# Patient Record
Sex: FEMALE | Race: WHITE | NOT HISPANIC OR LATINO | Employment: OTHER | URBAN - METROPOLITAN AREA
[De-identification: names, ages, dates, MRNs, and addresses within clinical notes are randomized per-mention and may not be internally consistent; named-entity substitution may affect disease eponyms.]

---

## 2018-08-16 PROBLEM — I65.29 CAROTID ARTERY STENOSIS: Status: ACTIVE | Noted: 2018-08-16

## 2018-08-16 PROBLEM — I65.21 STENOSIS OF RIGHT CAROTID ARTERY: Status: ACTIVE | Noted: 2018-08-16

## 2018-10-11 PROBLEM — I48.0 PAF (PAROXYSMAL ATRIAL FIBRILLATION): Status: ACTIVE | Noted: 2018-10-11

## 2018-10-11 PROBLEM — D72.829 LEUKOCYTOSIS: Status: ACTIVE | Noted: 2018-10-11

## 2018-10-11 PROBLEM — J18.9 PNEUMONIA: Status: ACTIVE | Noted: 2018-10-11

## 2021-03-06 PROBLEM — E87.5 HYPERKALEMIA: Status: ACTIVE | Noted: 2021-03-06

## 2021-03-06 PROBLEM — D64.9 ANEMIA: Status: ACTIVE | Noted: 2021-03-06

## 2021-03-06 PROBLEM — R06.02 SOB (SHORTNESS OF BREATH): Status: ACTIVE | Noted: 2021-03-06

## 2021-03-06 PROBLEM — E87.20 LACTIC ACIDOSIS: Status: ACTIVE | Noted: 2021-03-06

## 2021-03-08 PROBLEM — D50.0 IRON DEFICIENCY ANEMIA DUE TO CHRONIC BLOOD LOSS: Status: ACTIVE | Noted: 2021-03-08

## 2021-03-09 PROBLEM — I48.91 ATRIAL FIBRILLATION: Status: ACTIVE | Noted: 2021-03-09

## 2021-10-08 ENCOUNTER — HOSPITAL ENCOUNTER (OUTPATIENT)
Facility: HOSPITAL | Age: 71
LOS: 1 days | Discharge: HOME | End: 2021-10-09
Attending: FAMILY MEDICINE | Admitting: STUDENT IN AN ORGANIZED HEALTH CARE EDUCATION/TRAINING PROGRAM
Payer: MEDICARE

## 2021-10-08 DIAGNOSIS — D50.0 IRON DEFICIENCY ANEMIA DUE TO CHRONIC BLOOD LOSS: ICD-10-CM

## 2021-10-08 DIAGNOSIS — N17.9 AKI (ACUTE KIDNEY INJURY): ICD-10-CM

## 2021-10-08 DIAGNOSIS — K57.92 DIVERTICULITIS: ICD-10-CM

## 2021-10-08 DIAGNOSIS — K92.2 GASTROINTESTINAL HEMORRHAGE, UNSPECIFIED GASTROINTESTINAL HEMORRHAGE TYPE: ICD-10-CM

## 2021-10-08 DIAGNOSIS — R07.9 CHEST PAIN: ICD-10-CM

## 2021-10-08 DIAGNOSIS — R06.02 SOB (SHORTNESS OF BREATH): ICD-10-CM

## 2021-10-08 DIAGNOSIS — D64.9 ANEMIA, UNSPECIFIED TYPE: Primary | ICD-10-CM

## 2021-10-08 PROBLEM — D72.829 LEUKOCYTOSIS: Status: RESOLVED | Noted: 2018-10-11 | Resolved: 2021-10-08

## 2021-10-08 LAB
ABO GROUP BLD: NORMAL
ALBUMIN SERPL BCP-MCNC: 3.1 G/DL (ref 3.5–5.2)
ALP SERPL-CCNC: 89 U/L (ref 55–135)
ALT SERPL W/O P-5'-P-CCNC: 7 U/L (ref 10–44)
ANION GAP SERPL CALC-SCNC: 15 MMOL/L (ref 8–16)
ANISOCYTOSIS BLD QL SMEAR: ABNORMAL
AST SERPL-CCNC: 9 U/L (ref 10–40)
BASOPHILS # BLD AUTO: 0.08 K/UL (ref 0–0.2)
BASOPHILS NFR BLD: 0.4 % (ref 0–1.9)
BILIRUB SERPL-MCNC: 0.4 MG/DL (ref 0.1–1)
BLD GP AB SCN CELLS X3 SERPL QL: NORMAL
BNP SERPL-MCNC: 261 PG/ML (ref 0–99)
BUN SERPL-MCNC: 21 MG/DL (ref 8–23)
CALCIUM SERPL-MCNC: 9.2 MG/DL (ref 8.7–10.5)
CHLORIDE SERPL-SCNC: 105 MMOL/L (ref 95–110)
CO2 SERPL-SCNC: 19 MMOL/L (ref 23–29)
CREAT SERPL-MCNC: 1.9 MG/DL (ref 0.5–1.4)
D DIMER PPP IA.FEU-MCNC: 3.28 MG/L FEU
DIFFERENTIAL METHOD: ABNORMAL
EOSINOPHIL # BLD AUTO: 0.2 K/UL (ref 0–0.5)
EOSINOPHIL NFR BLD: 0.9 % (ref 0–8)
ERYTHROCYTE [DISTWIDTH] IN BLOOD BY AUTOMATED COUNT: 20 % (ref 11.5–14.5)
EST. GFR  (AFRICAN AMERICAN): 29.9 ML/MIN/1.73 M^2
EST. GFR  (NON AFRICAN AMERICAN): 26 ML/MIN/1.73 M^2
GLUCOSE SERPL-MCNC: 154 MG/DL (ref 70–110)
HCT VFR BLD AUTO: 22.1 % (ref 37–48.5)
HGB BLD-MCNC: 5.6 G/DL (ref 12–16)
IMM GRANULOCYTES # BLD AUTO: 0.3 K/UL (ref 0–0.04)
IMM GRANULOCYTES NFR BLD AUTO: 1.6 % (ref 0–0.5)
LYMPHOCYTES # BLD AUTO: 1.3 K/UL (ref 1–4.8)
LYMPHOCYTES NFR BLD: 7 % (ref 18–48)
MCH RBC QN AUTO: 17.4 PG (ref 27–31)
MCHC RBC AUTO-ENTMCNC: 25.3 G/DL (ref 32–36)
MCV RBC AUTO: 69 FL (ref 82–98)
MONOCYTES # BLD AUTO: 1.2 K/UL (ref 0.3–1)
MONOCYTES NFR BLD: 6.8 % (ref 4–15)
NEUTROPHILS # BLD AUTO: 15.3 K/UL (ref 1.8–7.7)
NEUTROPHILS NFR BLD: 83.3 % (ref 38–73)
NRBC BLD-RTO: 1 /100 WBC
OB PNL STL: POSITIVE
PLATELET # BLD AUTO: 632 K/UL (ref 150–450)
PLATELET BLD QL SMEAR: ABNORMAL
PMV BLD AUTO: 9.8 FL (ref 9.2–12.9)
POLYCHROMASIA BLD QL SMEAR: ABNORMAL
POTASSIUM SERPL-SCNC: 4.3 MMOL/L (ref 3.5–5.1)
PROT SERPL-MCNC: 7.2 G/DL (ref 6–8.4)
RBC # BLD AUTO: 3.22 M/UL (ref 4–5.4)
RH BLD: NORMAL
SARS-COV-2 RDRP RESP QL NAA+PROBE: NEGATIVE
SODIUM SERPL-SCNC: 139 MMOL/L (ref 136–145)
TARGETS BLD QL SMEAR: ABNORMAL
WBC # BLD AUTO: 18.35 K/UL (ref 3.9–12.7)

## 2021-10-08 PROCEDURE — 36415 COLL VENOUS BLD VENIPUNCTURE: CPT | Performed by: FAMILY MEDICINE

## 2021-10-08 PROCEDURE — S0030 INJECTION, METRONIDAZOLE: HCPCS | Performed by: STUDENT IN AN ORGANIZED HEALTH CARE EDUCATION/TRAINING PROGRAM

## 2021-10-08 PROCEDURE — 74176 CT ABDOMEN PELVIS WITHOUT CONTRAST: ICD-10-PCS | Mod: 26,,, | Performed by: RADIOLOGY

## 2021-10-08 PROCEDURE — 71250 CT THORAX DX C-: CPT | Mod: TC

## 2021-10-08 PROCEDURE — 80053 COMPREHEN METABOLIC PANEL: CPT | Performed by: FAMILY MEDICINE

## 2021-10-08 PROCEDURE — 93005 ELECTROCARDIOGRAM TRACING: CPT

## 2021-10-08 PROCEDURE — 27000221 HC OXYGEN, UP TO 24 HOURS

## 2021-10-08 PROCEDURE — 25000003 PHARM REV CODE 250: Performed by: FAMILY MEDICINE

## 2021-10-08 PROCEDURE — 85379 FIBRIN DEGRADATION QUANT: CPT | Performed by: FAMILY MEDICINE

## 2021-10-08 PROCEDURE — 86900 BLOOD TYPING SEROLOGIC ABO: CPT | Performed by: FAMILY MEDICINE

## 2021-10-08 PROCEDURE — 82272 OCCULT BLD FECES 1-3 TESTS: CPT | Performed by: FAMILY MEDICINE

## 2021-10-08 PROCEDURE — 83880 ASSAY OF NATRIURETIC PEPTIDE: CPT | Performed by: FAMILY MEDICINE

## 2021-10-08 PROCEDURE — 87040 BLOOD CULTURE FOR BACTERIA: CPT | Performed by: STUDENT IN AN ORGANIZED HEALTH CARE EDUCATION/TRAINING PROGRAM

## 2021-10-08 PROCEDURE — 36415 COLL VENOUS BLD VENIPUNCTURE: CPT | Performed by: STUDENT IN AN ORGANIZED HEALTH CARE EDUCATION/TRAINING PROGRAM

## 2021-10-08 PROCEDURE — U0002 COVID-19 LAB TEST NON-CDC: HCPCS | Performed by: FAMILY MEDICINE

## 2021-10-08 PROCEDURE — 86920 COMPATIBILITY TEST SPIN: CPT | Mod: 59 | Performed by: FAMILY MEDICINE

## 2021-10-08 PROCEDURE — G0378 HOSPITAL OBSERVATION PER HR: HCPCS

## 2021-10-08 PROCEDURE — 74176 CT ABD & PELVIS W/O CONTRAST: CPT | Mod: TC

## 2021-10-08 PROCEDURE — P9016 RBC LEUKOCYTES REDUCED: HCPCS | Performed by: FAMILY MEDICINE

## 2021-10-08 PROCEDURE — 85025 COMPLETE CBC W/AUTO DIFF WBC: CPT | Performed by: FAMILY MEDICINE

## 2021-10-08 PROCEDURE — 25000003 PHARM REV CODE 250: Performed by: STUDENT IN AN ORGANIZED HEALTH CARE EDUCATION/TRAINING PROGRAM

## 2021-10-08 PROCEDURE — 63600175 PHARM REV CODE 636 W HCPCS: Performed by: STUDENT IN AN ORGANIZED HEALTH CARE EDUCATION/TRAINING PROGRAM

## 2021-10-08 PROCEDURE — 96374 THER/PROPH/DIAG INJ IV PUSH: CPT

## 2021-10-08 PROCEDURE — 99285 EMERGENCY DEPT VISIT HI MDM: CPT | Mod: 25

## 2021-10-08 PROCEDURE — 74176 CT ABD & PELVIS W/O CONTRAST: CPT | Mod: 26,,, | Performed by: RADIOLOGY

## 2021-10-08 RX ORDER — FERROUS SULFATE 300 MG/5ML
300 LIQUID (ML) ORAL DAILY
Status: DISCONTINUED | OUTPATIENT
Start: 2021-10-09 | End: 2021-10-09 | Stop reason: HOSPADM

## 2021-10-08 RX ORDER — CIPROFLOXACIN 2 MG/ML
400 INJECTION, SOLUTION INTRAVENOUS
Status: DISCONTINUED | OUTPATIENT
Start: 2021-10-08 | End: 2021-10-09 | Stop reason: HOSPADM

## 2021-10-08 RX ORDER — HYDROCODONE BITARTRATE AND ACETAMINOPHEN 500; 5 MG/1; MG/1
TABLET ORAL
Status: DISCONTINUED | OUTPATIENT
Start: 2021-10-08 | End: 2021-10-09 | Stop reason: HOSPADM

## 2021-10-08 RX ORDER — AMIODARONE HYDROCHLORIDE 100 MG/1
100 TABLET ORAL DAILY
Status: DISCONTINUED | OUTPATIENT
Start: 2021-10-09 | End: 2021-10-09 | Stop reason: HOSPADM

## 2021-10-08 RX ORDER — POLYETHYLENE GLYCOL 3350 17 G/17G
17 POWDER, FOR SOLUTION ORAL 2 TIMES DAILY PRN
Status: DISCONTINUED | OUTPATIENT
Start: 2021-10-08 | End: 2021-10-09 | Stop reason: HOSPADM

## 2021-10-08 RX ORDER — NALOXONE HCL 0.4 MG/ML
0.02 VIAL (ML) INJECTION
Status: DISCONTINUED | OUTPATIENT
Start: 2021-10-08 | End: 2021-10-09 | Stop reason: HOSPADM

## 2021-10-08 RX ORDER — LANOLIN ALCOHOL/MO/W.PET/CERES
800 CREAM (GRAM) TOPICAL
Status: DISCONTINUED | OUTPATIENT
Start: 2021-10-08 | End: 2021-10-09 | Stop reason: HOSPADM

## 2021-10-08 RX ORDER — ONDANSETRON 2 MG/ML
4 INJECTION INTRAMUSCULAR; INTRAVENOUS EVERY 8 HOURS PRN
Status: DISCONTINUED | OUTPATIENT
Start: 2021-10-08 | End: 2021-10-09 | Stop reason: HOSPADM

## 2021-10-08 RX ORDER — GLUCAGON 1 MG
1 KIT INJECTION
Status: DISCONTINUED | OUTPATIENT
Start: 2021-10-08 | End: 2021-10-09 | Stop reason: HOSPADM

## 2021-10-08 RX ORDER — BISACODYL 10 MG
10 SUPPOSITORY, RECTAL RECTAL DAILY PRN
Status: DISCONTINUED | OUTPATIENT
Start: 2021-10-08 | End: 2021-10-09 | Stop reason: HOSPADM

## 2021-10-08 RX ORDER — SODIUM CHLORIDE 450 MG/100ML
INJECTION, SOLUTION INTRAVENOUS CONTINUOUS
Status: DISCONTINUED | OUTPATIENT
Start: 2021-10-08 | End: 2021-10-08

## 2021-10-08 RX ORDER — HYDROCODONE BITARTRATE AND ACETAMINOPHEN 5; 325 MG/1; MG/1
1 TABLET ORAL EVERY 6 HOURS PRN
Status: DISCONTINUED | OUTPATIENT
Start: 2021-10-08 | End: 2021-10-09 | Stop reason: HOSPADM

## 2021-10-08 RX ORDER — FAMOTIDINE 10 MG/ML
40 INJECTION INTRAVENOUS
Status: COMPLETED | OUTPATIENT
Start: 2021-10-08 | End: 2021-10-08

## 2021-10-08 RX ORDER — METRONIDAZOLE 500 MG/100ML
500 INJECTION, SOLUTION INTRAVENOUS
Status: DISCONTINUED | OUTPATIENT
Start: 2021-10-08 | End: 2021-10-09

## 2021-10-08 RX ORDER — SODIUM CHLORIDE 0.9 % (FLUSH) 0.9 %
10 SYRINGE (ML) INJECTION EVERY 12 HOURS PRN
Status: DISCONTINUED | OUTPATIENT
Start: 2021-10-08 | End: 2021-10-09 | Stop reason: HOSPADM

## 2021-10-08 RX ORDER — SODIUM CHLORIDE 0.9 % (FLUSH) 0.9 %
10 SYRINGE (ML) INJECTION
Status: DISCONTINUED | OUTPATIENT
Start: 2021-10-08 | End: 2021-10-09 | Stop reason: HOSPADM

## 2021-10-08 RX ORDER — ATORVASTATIN CALCIUM 10 MG/1
10 TABLET, FILM COATED ORAL DAILY
Status: DISCONTINUED | OUTPATIENT
Start: 2021-10-09 | End: 2021-10-09 | Stop reason: HOSPADM

## 2021-10-08 RX ORDER — VENLAFAXINE 37.5 MG/1
75 TABLET ORAL DAILY
Status: DISCONTINUED | OUTPATIENT
Start: 2021-10-09 | End: 2021-10-09 | Stop reason: HOSPADM

## 2021-10-08 RX ORDER — PANTOPRAZOLE SODIUM 40 MG/10ML
40 INJECTION, POWDER, LYOPHILIZED, FOR SOLUTION INTRAVENOUS DAILY
Status: DISCONTINUED | OUTPATIENT
Start: 2021-10-09 | End: 2021-10-09 | Stop reason: HOSPADM

## 2021-10-08 RX ORDER — ACETAMINOPHEN 325 MG/1
650 TABLET ORAL EVERY 6 HOURS PRN
Status: DISCONTINUED | OUTPATIENT
Start: 2021-10-08 | End: 2021-10-09 | Stop reason: HOSPADM

## 2021-10-08 RX ORDER — SODIUM,POTASSIUM PHOSPHATES 280-250MG
2 POWDER IN PACKET (EA) ORAL
Status: DISCONTINUED | OUTPATIENT
Start: 2021-10-08 | End: 2021-10-09 | Stop reason: HOSPADM

## 2021-10-08 RX ORDER — SODIUM CHLORIDE 9 MG/ML
INJECTION, SOLUTION INTRAVENOUS
Status: COMPLETED | OUTPATIENT
Start: 2021-10-08 | End: 2021-10-08

## 2021-10-08 RX ADMIN — METRONIDAZOLE 500 MG: 500 INJECTION, SOLUTION INTRAVENOUS at 05:10

## 2021-10-08 RX ADMIN — CIPROFLOXACIN 400 MG: 2 INJECTION, SOLUTION INTRAVENOUS at 05:10

## 2021-10-08 RX ADMIN — FAMOTIDINE 40 MG: 10 INJECTION INTRAVENOUS at 03:10

## 2021-10-08 RX ADMIN — SODIUM CHLORIDE 125 ML/HR: 0.9 INJECTION, SOLUTION INTRAVENOUS at 12:10

## 2021-10-08 RX ADMIN — ACETAMINOPHEN 650 MG: 325 TABLET ORAL at 05:10

## 2021-10-09 VITALS
HEIGHT: 63 IN | WEIGHT: 225.75 LBS | HEART RATE: 64 BPM | TEMPERATURE: 98 F | OXYGEN SATURATION: 96 % | SYSTOLIC BLOOD PRESSURE: 154 MMHG | BODY MASS INDEX: 40 KG/M2 | DIASTOLIC BLOOD PRESSURE: 71 MMHG | RESPIRATION RATE: 16 BRPM

## 2021-10-09 PROBLEM — R91.8 LUNG NODULES: Status: ACTIVE | Noted: 2021-10-09

## 2021-10-09 LAB
ALBUMIN SERPL BCP-MCNC: 2.9 G/DL (ref 3.5–5.2)
ALP SERPL-CCNC: 81 U/L (ref 55–135)
ALT SERPL W/O P-5'-P-CCNC: 7 U/L (ref 10–44)
ANION GAP SERPL CALC-SCNC: 11 MMOL/L (ref 8–16)
AST SERPL-CCNC: 7 U/L (ref 10–40)
BASOPHILS # BLD AUTO: 0.06 K/UL (ref 0–0.2)
BASOPHILS NFR BLD: 0.4 % (ref 0–1.9)
BILIRUB SERPL-MCNC: 0.8 MG/DL (ref 0.1–1)
BLD PROD TYP BPU: NORMAL
BLOOD UNIT EXPIRATION DATE: NORMAL
BLOOD UNIT TYPE CODE: 6200
BLOOD UNIT TYPE: NORMAL
BUN SERPL-MCNC: 17 MG/DL (ref 8–23)
CALCIUM SERPL-MCNC: 8.5 MG/DL (ref 8.7–10.5)
CHLORIDE SERPL-SCNC: 105 MMOL/L (ref 95–110)
CO2 SERPL-SCNC: 20 MMOL/L (ref 23–29)
CODING SYSTEM: NORMAL
CREAT SERPL-MCNC: 1.3 MG/DL (ref 0.5–1.4)
DIFFERENTIAL METHOD: ABNORMAL
DISPENSE STATUS: NORMAL
EOSINOPHIL # BLD AUTO: 0.5 K/UL (ref 0–0.5)
EOSINOPHIL NFR BLD: 3.1 % (ref 0–8)
ERYTHROCYTE [DISTWIDTH] IN BLOOD BY AUTOMATED COUNT: 24.1 % (ref 11.5–14.5)
EST. GFR  (AFRICAN AMERICAN): 47.4 ML/MIN/1.73 M^2
EST. GFR  (NON AFRICAN AMERICAN): 41.1 ML/MIN/1.73 M^2
GLUCOSE SERPL-MCNC: 124 MG/DL (ref 70–110)
HCT VFR BLD AUTO: 24.9 % (ref 37–48.5)
HCT VFR BLD AUTO: 31.3 % (ref 37–48.5)
HGB BLD-MCNC: 7 G/DL (ref 12–16)
HGB BLD-MCNC: 9.2 G/DL (ref 12–16)
IMM GRANULOCYTES # BLD AUTO: 0.14 K/UL (ref 0–0.04)
IMM GRANULOCYTES NFR BLD AUTO: 1 % (ref 0–0.5)
LYMPHOCYTES # BLD AUTO: 1.7 K/UL (ref 1–4.8)
LYMPHOCYTES NFR BLD: 11.5 % (ref 18–48)
MAGNESIUM SERPL-MCNC: 1.5 MG/DL (ref 1.6–2.6)
MCH RBC QN AUTO: 20.6 PG (ref 27–31)
MCHC RBC AUTO-ENTMCNC: 28.1 G/DL (ref 32–36)
MCV RBC AUTO: 73 FL (ref 82–98)
MONOCYTES # BLD AUTO: 1 K/UL (ref 0.3–1)
MONOCYTES NFR BLD: 6.7 % (ref 4–15)
NEUTROPHILS # BLD AUTO: 11.4 K/UL (ref 1.8–7.7)
NEUTROPHILS NFR BLD: 77.3 % (ref 38–73)
NRBC BLD-RTO: 1 /100 WBC
NUM UNITS TRANS PACKED RBC: NORMAL
PHOSPHATE SERPL-MCNC: 2.7 MG/DL (ref 2.7–4.5)
PLATELET # BLD AUTO: 454 K/UL (ref 150–450)
PMV BLD AUTO: 10 FL (ref 9.2–12.9)
POTASSIUM SERPL-SCNC: 3.4 MMOL/L (ref 3.5–5.1)
PROT SERPL-MCNC: 6.3 G/DL (ref 6–8.4)
RBC # BLD AUTO: 3.4 M/UL (ref 4–5.4)
SODIUM SERPL-SCNC: 136 MMOL/L (ref 136–145)
WBC # BLD AUTO: 14.7 K/UL (ref 3.9–12.7)

## 2021-10-09 PROCEDURE — 25000003 PHARM REV CODE 250: Performed by: STUDENT IN AN ORGANIZED HEALTH CARE EDUCATION/TRAINING PROGRAM

## 2021-10-09 PROCEDURE — 96376 TX/PRO/DX INJ SAME DRUG ADON: CPT

## 2021-10-09 PROCEDURE — S0030 INJECTION, METRONIDAZOLE: HCPCS | Performed by: STUDENT IN AN ORGANIZED HEALTH CARE EDUCATION/TRAINING PROGRAM

## 2021-10-09 PROCEDURE — 25000003 PHARM REV CODE 250: Performed by: FAMILY MEDICINE

## 2021-10-09 PROCEDURE — 85025 COMPLETE CBC W/AUTO DIFF WBC: CPT | Performed by: STUDENT IN AN ORGANIZED HEALTH CARE EDUCATION/TRAINING PROGRAM

## 2021-10-09 PROCEDURE — 36415 COLL VENOUS BLD VENIPUNCTURE: CPT | Performed by: STUDENT IN AN ORGANIZED HEALTH CARE EDUCATION/TRAINING PROGRAM

## 2021-10-09 PROCEDURE — 85018 HEMOGLOBIN: CPT | Mod: 91 | Performed by: FAMILY MEDICINE

## 2021-10-09 PROCEDURE — 83735 ASSAY OF MAGNESIUM: CPT | Performed by: STUDENT IN AN ORGANIZED HEALTH CARE EDUCATION/TRAINING PROGRAM

## 2021-10-09 PROCEDURE — 80053 COMPREHEN METABOLIC PANEL: CPT | Performed by: STUDENT IN AN ORGANIZED HEALTH CARE EDUCATION/TRAINING PROGRAM

## 2021-10-09 PROCEDURE — 99217 PR OBSERVATION CARE DISCHARGE: ICD-10-PCS | Mod: ,,, | Performed by: FAMILY MEDICINE

## 2021-10-09 PROCEDURE — 99204 OFFICE O/P NEW MOD 45 MIN: CPT | Mod: ,,, | Performed by: SURGERY

## 2021-10-09 PROCEDURE — 63600175 PHARM REV CODE 636 W HCPCS: Performed by: STUDENT IN AN ORGANIZED HEALTH CARE EDUCATION/TRAINING PROGRAM

## 2021-10-09 PROCEDURE — 96375 TX/PRO/DX INJ NEW DRUG ADDON: CPT

## 2021-10-09 PROCEDURE — 84100 ASSAY OF PHOSPHORUS: CPT | Performed by: STUDENT IN AN ORGANIZED HEALTH CARE EDUCATION/TRAINING PROGRAM

## 2021-10-09 PROCEDURE — G0378 HOSPITAL OBSERVATION PER HR: HCPCS

## 2021-10-09 PROCEDURE — 99204 PR OFFICE/OUTPT VISIT, NEW, LEVL IV, 45-59 MIN: ICD-10-PCS | Mod: ,,, | Performed by: SURGERY

## 2021-10-09 PROCEDURE — 85014 HEMATOCRIT: CPT | Performed by: FAMILY MEDICINE

## 2021-10-09 PROCEDURE — 36415 COLL VENOUS BLD VENIPUNCTURE: CPT | Performed by: FAMILY MEDICINE

## 2021-10-09 PROCEDURE — P9016 RBC LEUKOCYTES REDUCED: HCPCS | Performed by: FAMILY MEDICINE

## 2021-10-09 PROCEDURE — C9113 INJ PANTOPRAZOLE SODIUM, VIA: HCPCS | Performed by: STUDENT IN AN ORGANIZED HEALTH CARE EDUCATION/TRAINING PROGRAM

## 2021-10-09 PROCEDURE — 94761 N-INVAS EAR/PLS OXIMETRY MLT: CPT

## 2021-10-09 PROCEDURE — 99217 PR OBSERVATION CARE DISCHARGE: CPT | Mod: ,,, | Performed by: FAMILY MEDICINE

## 2021-10-09 RX ORDER — CIPROFLOXACIN 500 MG/1
500 TABLET ORAL 2 TIMES DAILY
Qty: 20 TABLET | Refills: 0 | Status: SHIPPED | OUTPATIENT
Start: 2021-10-09 | End: 2023-06-20

## 2021-10-09 RX ORDER — METRONIDAZOLE 500 MG/1
500 TABLET ORAL 3 TIMES DAILY
Qty: 30 TABLET | Refills: 0 | Status: SHIPPED | OUTPATIENT
Start: 2021-10-09 | End: 2023-06-20

## 2021-10-09 RX ORDER — ONDANSETRON 4 MG/1
4 TABLET, FILM COATED ORAL EVERY 6 HOURS PRN
Qty: 30 TABLET | Refills: 1 | Status: SHIPPED | OUTPATIENT
Start: 2021-10-09 | End: 2023-06-20

## 2021-10-09 RX ORDER — METRONIDAZOLE 250 MG/1
500 TABLET ORAL EVERY 8 HOURS
Status: DISCONTINUED | OUTPATIENT
Start: 2021-10-09 | End: 2021-10-09 | Stop reason: HOSPADM

## 2021-10-09 RX ORDER — HYDROCODONE BITARTRATE AND ACETAMINOPHEN 500; 5 MG/1; MG/1
TABLET ORAL
Status: DISCONTINUED | OUTPATIENT
Start: 2021-10-09 | End: 2021-10-09 | Stop reason: HOSPADM

## 2021-10-09 RX ADMIN — CIPROFLOXACIN 400 MG: 2 INJECTION, SOLUTION INTRAVENOUS at 05:10

## 2021-10-09 RX ADMIN — VENLAFAXINE 75 MG: 37.5 TABLET ORAL at 09:10

## 2021-10-09 RX ADMIN — HYDROCODONE BITARTRATE AND ACETAMINOPHEN 1 TABLET: 5; 325 TABLET ORAL at 01:10

## 2021-10-09 RX ADMIN — SODIUM CHLORIDE: 0.9 INJECTION, SOLUTION INTRAVENOUS at 10:10

## 2021-10-09 RX ADMIN — PANTOPRAZOLE SODIUM 40 MG: 40 INJECTION, POWDER, LYOPHILIZED, FOR SOLUTION INTRAVENOUS at 09:10

## 2021-10-09 RX ADMIN — AMIODARONE HYDROCHLORIDE 100 MG: 100 TABLET ORAL at 09:10

## 2021-10-09 RX ADMIN — ACETAMINOPHEN 650 MG: 325 TABLET ORAL at 01:10

## 2021-10-09 RX ADMIN — Medication 800 MG: at 01:10

## 2021-10-09 RX ADMIN — MINERAL SUPPLEMENT IRON 300 MG / 5 ML STRENGTH LIQUID 100 PER BOX UNFLAVORED 300 MG: at 09:10

## 2021-10-09 RX ADMIN — METRONIDAZOLE 500 MG: 250 TABLET ORAL at 01:10

## 2021-10-09 RX ADMIN — METRONIDAZOLE 500 MG: 500 INJECTION, SOLUTION INTRAVENOUS at 04:10

## 2021-10-09 RX ADMIN — ATORVASTATIN CALCIUM 10 MG: 10 TABLET, FILM COATED ORAL at 09:10

## 2021-10-09 RX ADMIN — METRONIDAZOLE 500 MG: 250 TABLET ORAL at 08:10

## 2021-10-13 LAB
BACTERIA BLD CULT: NORMAL
BACTERIA BLD CULT: NORMAL

## 2023-06-20 ENCOUNTER — OFFICE VISIT (OUTPATIENT)
Dept: FAMILY MEDICINE | Facility: CLINIC | Age: 73
End: 2023-06-20
Payer: MEDICARE

## 2023-06-20 VITALS
BODY MASS INDEX: 40.22 KG/M2 | HEART RATE: 87 BPM | WEIGHT: 227 LBS | DIASTOLIC BLOOD PRESSURE: 103 MMHG | HEIGHT: 63 IN | SYSTOLIC BLOOD PRESSURE: 208 MMHG

## 2023-06-20 DIAGNOSIS — Z12.31 ENCOUNTER FOR SCREENING MAMMOGRAM FOR MALIGNANT NEOPLASM OF BREAST: ICD-10-CM

## 2023-06-20 DIAGNOSIS — I10 PRIMARY HYPERTENSION: Primary | ICD-10-CM

## 2023-06-20 DIAGNOSIS — G89.29 CHRONIC RIGHT SHOULDER PAIN: ICD-10-CM

## 2023-06-20 DIAGNOSIS — Z13.31 POSITIVE DEPRESSION SCREENING: ICD-10-CM

## 2023-06-20 DIAGNOSIS — I48.11 LONGSTANDING PERSISTENT ATRIAL FIBRILLATION: ICD-10-CM

## 2023-06-20 DIAGNOSIS — M25.511 CHRONIC RIGHT SHOULDER PAIN: ICD-10-CM

## 2023-06-20 DIAGNOSIS — K29.70 GASTRITIS, PRESENCE OF BLEEDING UNSPECIFIED, UNSPECIFIED CHRONICITY, UNSPECIFIED GASTRITIS TYPE: ICD-10-CM

## 2023-06-20 DIAGNOSIS — I50.9 HEART FAILURE, UNSPECIFIED HF CHRONICITY, UNSPECIFIED HEART FAILURE TYPE: ICD-10-CM

## 2023-06-20 DIAGNOSIS — E66.01 MORBID OBESITY: ICD-10-CM

## 2023-06-20 DIAGNOSIS — F33.9 DEPRESSION, RECURRENT: ICD-10-CM

## 2023-06-20 PROCEDURE — 99204 PR OFFICE/OUTPT VISIT, NEW, LEVL IV, 45-59 MIN: ICD-10-PCS | Mod: ,,, | Performed by: FAMILY MEDICINE

## 2023-06-20 PROCEDURE — 80061 LIPID PANEL: CPT | Mod: ,,, | Performed by: CLINICAL MEDICAL LABORATORY

## 2023-06-20 PROCEDURE — 80061 LIPID PANEL: ICD-10-PCS | Mod: ,,, | Performed by: CLINICAL MEDICAL LABORATORY

## 2023-06-20 PROCEDURE — 99204 OFFICE O/P NEW MOD 45 MIN: CPT | Mod: ,,, | Performed by: FAMILY MEDICINE

## 2023-06-20 RX ORDER — PANTOPRAZOLE SODIUM 40 MG/1
40 TABLET, DELAYED RELEASE ORAL DAILY
COMMUNITY
End: 2023-06-20 | Stop reason: SDUPTHER

## 2023-06-20 RX ORDER — PRAVASTATIN SODIUM 80 MG/1
80 TABLET ORAL DAILY
Qty: 30 TABLET | Refills: 5 | Status: SHIPPED | OUTPATIENT
Start: 2023-06-20 | End: 2023-12-17

## 2023-06-20 RX ORDER — VENLAFAXINE HYDROCHLORIDE 150 MG/1
150 CAPSULE, EXTENDED RELEASE ORAL DAILY
Qty: 30 CAPSULE | Refills: 5 | Status: SHIPPED | OUTPATIENT
Start: 2023-06-20 | End: 2023-12-17

## 2023-06-20 RX ORDER — METOPROLOL SUCCINATE 50 MG/1
50 TABLET, EXTENDED RELEASE ORAL 2 TIMES DAILY
Qty: 60 TABLET | Refills: 11 | Status: SHIPPED | OUTPATIENT
Start: 2023-06-20 | End: 2024-06-19

## 2023-06-20 RX ORDER — PANTOPRAZOLE SODIUM 40 MG/1
40 TABLET, DELAYED RELEASE ORAL DAILY
Qty: 30 TABLET | Refills: 5 | Status: SHIPPED | OUTPATIENT
Start: 2023-06-20 | End: 2023-12-17

## 2023-06-20 RX ORDER — HYDROCODONE BITARTRATE AND ACETAMINOPHEN 5; 325 MG/1; MG/1
1 TABLET ORAL EVERY 4 HOURS PRN
Qty: 42 TABLET | Refills: 0 | Status: SHIPPED | OUTPATIENT
Start: 2023-06-20 | End: 2023-06-27

## 2023-06-20 RX ORDER — NIFEDIPINE 60 MG/1
60 TABLET, EXTENDED RELEASE ORAL DAILY
Qty: 30 TABLET | Refills: 5 | Status: SHIPPED | OUTPATIENT
Start: 2023-06-20 | End: 2023-12-17

## 2023-06-20 NOTE — ASSESSMENT & PLAN NOTE
Patient is hypertensive in the office today at 200/100 she had any blood pressure medications for over a month and says that her blood pressure is usually normal otherwise.  We will start her back on her medications today with anticipation remote blood pressure check
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
## 2023-06-20 NOTE — ASSESSMENT & PLAN NOTE
Patient's right shoulder has been displaced in the past most recently 1 in 3 years she was making the bed the other night when pop the sheet she believes it popped out of place again.  On examination the ball does feel appropriate however differential motion limits abduct to less than 90° total abduction through active range of motion is approximately 10-15 degrees there is concern for muscle/tendon damage and I will be requesting an MRI of the shoulder.  Patient does have stainless still pins in the jaw.  States he is had MRIs done since having this previously.  Will recover records.

## 2023-06-21 LAB
CHOLEST SERPL-MCNC: 201 MG/DL (ref 0–200)
CHOLEST/HDLC SERPL: 3.6 {RATIO}
HDLC SERPL-MCNC: 56 MG/DL (ref 40–60)
LDLC SERPL CALC-MCNC: 107 MG/DL
LDLC/HDLC SERPL: 1.9 {RATIO}
NONHDLC SERPL-MCNC: 145 MG/DL
TRIGL SERPL-MCNC: 191 MG/DL (ref 35–150)
VLDLC SERPL-MCNC: 38 MG/DL

## 2023-06-26 ENCOUNTER — TELEPHONE (OUTPATIENT)
Dept: FAMILY MEDICINE | Facility: CLINIC | Age: 73
End: 2023-06-26
Payer: MEDICARE

## 2023-06-29 ENCOUNTER — HOSPITAL ENCOUNTER (OUTPATIENT)
Dept: RADIOLOGY | Facility: HOSPITAL | Age: 73
Discharge: HOME | End: 2023-06-29
Attending: FAMILY MEDICINE
Payer: MEDICARE

## 2023-06-29 DIAGNOSIS — G89.29 CHRONIC RIGHT SHOULDER PAIN: ICD-10-CM

## 2023-06-29 DIAGNOSIS — M25.511 CHRONIC RIGHT SHOULDER PAIN: ICD-10-CM

## 2023-06-29 PROCEDURE — 73221 MRI JOINT UPR EXTREM W/O DYE: CPT | Mod: TC,RT

## 2023-07-03 DIAGNOSIS — T14.8XXA TENDON TEAR: Primary | ICD-10-CM

## 2023-07-07 DIAGNOSIS — M25.511 RIGHT SHOULDER PAIN, UNSPECIFIED CHRONICITY: Primary | ICD-10-CM

## 2023-07-17 ENCOUNTER — HOSPITAL ENCOUNTER (OUTPATIENT)
Dept: RADIOLOGY | Facility: HOSPITAL | Age: 73
Discharge: HOME | End: 2023-07-17
Attending: ORTHOPAEDIC SURGERY
Payer: MEDICARE

## 2023-07-17 ENCOUNTER — OFFICE VISIT (OUTPATIENT)
Dept: ORTHOPEDICS | Facility: CLINIC | Age: 73
End: 2023-07-17
Payer: MEDICARE

## 2023-07-17 DIAGNOSIS — T14.8XXA TENDON TEAR: ICD-10-CM

## 2023-07-17 DIAGNOSIS — M25.511 RIGHT SHOULDER PAIN, UNSPECIFIED CHRONICITY: ICD-10-CM

## 2023-07-17 DIAGNOSIS — S46.011A TRAUMATIC COMPLETE TEAR OF RIGHT ROTATOR CUFF, INITIAL ENCOUNTER: Primary | ICD-10-CM

## 2023-07-17 PROCEDURE — 73030 X-RAY EXAM OF SHOULDER: CPT | Mod: TC,RT

## 2023-07-17 PROCEDURE — 99204 OFFICE O/P NEW MOD 45 MIN: CPT | Mod: S$PBB,,, | Performed by: ORTHOPAEDIC SURGERY

## 2023-07-17 PROCEDURE — 99213 OFFICE O/P EST LOW 20 MIN: CPT | Mod: PBBFAC | Performed by: ORTHOPAEDIC SURGERY

## 2023-07-17 PROCEDURE — 73030 XR SHOULDER COMPLETE 2 OR MORE VIEWS RIGHT: ICD-10-PCS | Mod: 26,RT,, | Performed by: ORTHOPAEDIC SURGERY

## 2023-07-17 PROCEDURE — 73030 X-RAY EXAM OF SHOULDER: CPT | Mod: 26,RT,, | Performed by: ORTHOPAEDIC SURGERY

## 2023-07-17 PROCEDURE — 99204 PR OFFICE/OUTPT VISIT, NEW, LEVL IV, 45-59 MIN: ICD-10-PCS | Mod: S$PBB,,, | Performed by: ORTHOPAEDIC SURGERY
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
## 2023-07-20 DIAGNOSIS — Z01.810 PREOP CARDIOVASCULAR EXAM: ICD-10-CM

## 2023-07-20 DIAGNOSIS — M75.41 IMPINGEMENT SYNDROME OF RIGHT SHOULDER: ICD-10-CM

## 2023-07-20 DIAGNOSIS — S46.011A TRAUMATIC COMPLETE TEAR OF RIGHT ROTATOR CUFF, INITIAL ENCOUNTER: Primary | ICD-10-CM

## 2023-07-20 DIAGNOSIS — Z01.818 PRE-OPERATIVE CLEARANCE: ICD-10-CM

## 2023-07-20 DIAGNOSIS — Z01.812 PRE-PROCEDURE LAB EXAM: ICD-10-CM

## 2023-07-20 DIAGNOSIS — Z01.811 PREOP RESPIRATORY EXAM: ICD-10-CM

## 2023-08-10 ENCOUNTER — ANESTHESIA EVENT (OUTPATIENT)
Dept: SURGERY | Facility: HOSPITAL | Age: 73
End: 2023-08-10
Payer: MEDICARE

## 2023-08-10 ENCOUNTER — HOSPITAL ENCOUNTER (OUTPATIENT)
Facility: HOSPITAL | Age: 73
Discharge: HOME | End: 2023-08-10
Attending: ORTHOPAEDIC SURGERY | Admitting: ORTHOPAEDIC SURGERY
Payer: MEDICARE

## 2023-08-10 ENCOUNTER — ANESTHESIA (OUTPATIENT)
Dept: SURGERY | Facility: HOSPITAL | Age: 73
End: 2023-08-10
Payer: MEDICARE

## 2023-08-10 VITALS
WEIGHT: 222 LBS | OXYGEN SATURATION: 95 % | DIASTOLIC BLOOD PRESSURE: 72 MMHG | RESPIRATION RATE: 20 BRPM | HEIGHT: 63 IN | TEMPERATURE: 98 F | BODY MASS INDEX: 39.34 KG/M2 | HEART RATE: 95 BPM | SYSTOLIC BLOOD PRESSURE: 139 MMHG

## 2023-08-10 DIAGNOSIS — I10 HTN (HYPERTENSION): ICD-10-CM

## 2023-08-10 DIAGNOSIS — S46.011A TRAUMATIC COMPLETE TEAR OF RIGHT ROTATOR CUFF: Primary | ICD-10-CM

## 2023-08-10 PROCEDURE — 29824 SHO ARTHRS SRG DSTL CLAVICLC: CPT | Mod: RT,,, | Performed by: ORTHOPAEDIC SURGERY

## 2023-08-10 PROCEDURE — 25000242 PHARM REV CODE 250 ALT 637 W/ HCPCS: Performed by: ORTHOPAEDIC SURGERY

## 2023-08-10 PROCEDURE — 27000655: Performed by: NURSE ANESTHETIST, CERTIFIED REGISTERED

## 2023-08-10 PROCEDURE — 93010 ELECTROCARDIOGRAM REPORT: CPT | Mod: ,,, | Performed by: STUDENT IN AN ORGANIZED HEALTH CARE EDUCATION/TRAINING PROGRAM

## 2023-08-10 PROCEDURE — 63600175 PHARM REV CODE 636 W HCPCS: Performed by: NURSE ANESTHETIST, CERTIFIED REGISTERED

## 2023-08-10 PROCEDURE — 71000033 HC RECOVERY, INTIAL HOUR: Performed by: ORTHOPAEDIC SURGERY

## 2023-08-10 PROCEDURE — 37000008 HC ANESTHESIA 1ST 15 MINUTES: Performed by: ORTHOPAEDIC SURGERY

## 2023-08-10 PROCEDURE — 64415 NJX AA&/STRD BRCH PLXS IMG: CPT | Mod: 59,RT,, | Performed by: ANESTHESIOLOGY

## 2023-08-10 PROCEDURE — D9220A PRA ANESTHESIA: ICD-10-PCS | Mod: ANES,,, | Performed by: ANESTHESIOLOGY

## 2023-08-10 PROCEDURE — 64415 PERIPHERAL BLOCK: ICD-10-PCS | Mod: 59,RT,, | Performed by: ANESTHESIOLOGY

## 2023-08-10 PROCEDURE — 63600175 PHARM REV CODE 636 W HCPCS: Performed by: ORTHOPAEDIC SURGERY

## 2023-08-10 PROCEDURE — 36000711: Performed by: ORTHOPAEDIC SURGERY

## 2023-08-10 PROCEDURE — 27000165 HC TUBE, ETT CUFFED: Performed by: NURSE ANESTHETIST, CERTIFIED REGISTERED

## 2023-08-10 PROCEDURE — 27000716 HC OXISENSOR PROBE, ANY SIZE: Performed by: NURSE ANESTHETIST, CERTIFIED REGISTERED

## 2023-08-10 PROCEDURE — D9220A PRA ANESTHESIA: ICD-10-PCS | Mod: CRNA,,, | Performed by: NURSE ANESTHETIST, CERTIFIED REGISTERED

## 2023-08-10 PROCEDURE — 71000016 HC POSTOP RECOV ADDL HR: Performed by: ORTHOPAEDIC SURGERY

## 2023-08-10 PROCEDURE — 36000710: Performed by: ORTHOPAEDIC SURGERY

## 2023-08-10 PROCEDURE — 25000003 PHARM REV CODE 250: Performed by: NURSE ANESTHETIST, CERTIFIED REGISTERED

## 2023-08-10 PROCEDURE — 93010 EKG 12-LEAD: ICD-10-PCS | Mod: ,,, | Performed by: STUDENT IN AN ORGANIZED HEALTH CARE EDUCATION/TRAINING PROGRAM

## 2023-08-10 PROCEDURE — 27000689 HC BLADE LARYNGOSCOPE ANY SIZE: Performed by: NURSE ANESTHETIST, CERTIFIED REGISTERED

## 2023-08-10 PROCEDURE — 27000510 HC BLANKET BAIR HUGGER ANY SIZE: Performed by: NURSE ANESTHETIST, CERTIFIED REGISTERED

## 2023-08-10 PROCEDURE — D9220A PRA ANESTHESIA: Mod: ANES,,, | Performed by: ANESTHESIOLOGY

## 2023-08-10 PROCEDURE — 29826 SHO ARTHRS SRG DECOMPRESSION: CPT | Mod: RT,,, | Performed by: ORTHOPAEDIC SURGERY

## 2023-08-10 PROCEDURE — 29826 PR SHLDR ARTHROSCOP,PART ACROMIOPLAS: ICD-10-PCS | Mod: RT,,, | Performed by: ORTHOPAEDIC SURGERY

## 2023-08-10 PROCEDURE — 29823 SHO ARTHRS SRG XTNSV DBRDMT: CPT | Mod: 51,RT,, | Performed by: ORTHOPAEDIC SURGERY

## 2023-08-10 PROCEDURE — 63600175 PHARM REV CODE 636 W HCPCS

## 2023-08-10 PROCEDURE — 63600175 PHARM REV CODE 636 W HCPCS: Performed by: ANESTHESIOLOGY

## 2023-08-10 PROCEDURE — 27201423 OPTIME MED/SURG SUP & DEVICES STERILE SUPPLY: Performed by: ORTHOPAEDIC SURGERY

## 2023-08-10 PROCEDURE — 25000003 PHARM REV CODE 250: Performed by: ORTHOPAEDIC SURGERY

## 2023-08-10 PROCEDURE — 25000003 PHARM REV CODE 250: Performed by: INTERNAL MEDICINE

## 2023-08-10 PROCEDURE — 71000015 HC POSTOP RECOV 1ST HR: Performed by: ORTHOPAEDIC SURGERY

## 2023-08-10 PROCEDURE — 29824 PR SHLDR ARTHROSCOP,SURG,DIS CLAVICULECTOMY: ICD-10-PCS | Mod: RT,,, | Performed by: ORTHOPAEDIC SURGERY

## 2023-08-10 PROCEDURE — D9220A PRA ANESTHESIA: Mod: CRNA,,, | Performed by: NURSE ANESTHETIST, CERTIFIED REGISTERED

## 2023-08-10 PROCEDURE — 93005 ELECTROCARDIOGRAM TRACING: CPT

## 2023-08-10 PROCEDURE — 94640 AIRWAY INHALATION TREATMENT: CPT

## 2023-08-10 PROCEDURE — 29823 PR SHLDR ARTHROSCOP,EXTEN DEBRIDE: ICD-10-PCS | Mod: 51,RT,, | Performed by: ORTHOPAEDIC SURGERY

## 2023-08-10 PROCEDURE — 37000009 HC ANESTHESIA EA ADD 15 MINS: Performed by: ORTHOPAEDIC SURGERY

## 2023-08-10 RX ORDER — ONDANSETRON 2 MG/ML
4 INJECTION INTRAMUSCULAR; INTRAVENOUS DAILY PRN
Status: DISCONTINUED | OUTPATIENT
Start: 2023-08-10 | End: 2023-08-10 | Stop reason: HOSPADM

## 2023-08-10 RX ORDER — OXYCODONE HYDROCHLORIDE 5 MG/1
5 TABLET ORAL
Status: DISCONTINUED | OUTPATIENT
Start: 2023-08-10 | End: 2023-08-10 | Stop reason: HOSPADM

## 2023-08-10 RX ORDER — MEPERIDINE HYDROCHLORIDE 25 MG/ML
25 INJECTION INTRAMUSCULAR; INTRAVENOUS; SUBCUTANEOUS EVERY 10 MIN PRN
Status: DISCONTINUED | OUTPATIENT
Start: 2023-08-10 | End: 2023-08-10 | Stop reason: HOSPADM

## 2023-08-10 RX ORDER — ONDANSETRON 2 MG/ML
INJECTION INTRAMUSCULAR; INTRAVENOUS
Status: DISCONTINUED | OUTPATIENT
Start: 2023-08-10 | End: 2023-08-10

## 2023-08-10 RX ORDER — LIDOCAINE HYDROCHLORIDE 10 MG/ML
1 INJECTION INFILTRATION; PERINEURAL ONCE
Status: DISCONTINUED | OUTPATIENT
Start: 2023-08-10 | End: 2023-08-10 | Stop reason: HOSPADM

## 2023-08-10 RX ORDER — HYDROCODONE BITARTRATE AND ACETAMINOPHEN 10; 325 MG/1; MG/1
1 TABLET ORAL EVERY 4 HOURS PRN
Status: DISCONTINUED | OUTPATIENT
Start: 2023-08-10 | End: 2023-08-10 | Stop reason: HOSPADM

## 2023-08-10 RX ORDER — HYDROMORPHONE HYDROCHLORIDE 2 MG/ML
0.5 INJECTION, SOLUTION INTRAMUSCULAR; INTRAVENOUS; SUBCUTANEOUS EVERY 5 MIN PRN
Status: DISCONTINUED | OUTPATIENT
Start: 2023-08-10 | End: 2023-08-10 | Stop reason: HOSPADM

## 2023-08-10 RX ORDER — GLYCOPYRROLATE 0.2 MG/ML
INJECTION INTRAMUSCULAR; INTRAVENOUS
Status: DISCONTINUED | OUTPATIENT
Start: 2023-08-10 | End: 2023-08-10

## 2023-08-10 RX ORDER — PHENYLEPHRINE HYDROCHLORIDE 10 MG/ML
INJECTION INTRAVENOUS
Status: DISCONTINUED | OUTPATIENT
Start: 2023-08-10 | End: 2023-08-10

## 2023-08-10 RX ORDER — ROCURONIUM BROMIDE 10 MG/ML
INJECTION, SOLUTION INTRAVENOUS
Status: DISCONTINUED | OUTPATIENT
Start: 2023-08-10 | End: 2023-08-10

## 2023-08-10 RX ORDER — ACETAMINOPHEN 500 MG
1000 TABLET ORAL EVERY 6 HOURS PRN
Status: DISCONTINUED | OUTPATIENT
Start: 2023-08-10 | End: 2023-08-10 | Stop reason: HOSPADM

## 2023-08-10 RX ORDER — ROPIVACAINE HYDROCHLORIDE 7.5 MG/ML
INJECTION, SOLUTION EPIDURAL; PERINEURAL
Status: COMPLETED | OUTPATIENT
Start: 2023-08-10 | End: 2023-08-10

## 2023-08-10 RX ORDER — FENTANYL CITRATE 50 UG/ML
INJECTION, SOLUTION INTRAMUSCULAR; INTRAVENOUS
Status: DISCONTINUED | OUTPATIENT
Start: 2023-08-10 | End: 2023-08-10

## 2023-08-10 RX ORDER — HYDROCODONE BITARTRATE AND ACETAMINOPHEN 5; 325 MG/1; MG/1
1 TABLET ORAL EVERY 4 HOURS PRN
Status: DISCONTINUED | OUTPATIENT
Start: 2023-08-10 | End: 2023-08-10 | Stop reason: HOSPADM

## 2023-08-10 RX ORDER — SODIUM CHLORIDE, SODIUM LACTATE, POTASSIUM CHLORIDE, CALCIUM CHLORIDE 600; 310; 30; 20 MG/100ML; MG/100ML; MG/100ML; MG/100ML
125 INJECTION, SOLUTION INTRAVENOUS CONTINUOUS
Status: DISCONTINUED | OUTPATIENT
Start: 2023-08-10 | End: 2023-08-10 | Stop reason: HOSPADM

## 2023-08-10 RX ORDER — KETOROLAC TROMETHAMINE 30 MG/ML
INJECTION, SOLUTION INTRAMUSCULAR; INTRAVENOUS
Status: DISCONTINUED | OUTPATIENT
Start: 2023-08-10 | End: 2023-08-10

## 2023-08-10 RX ORDER — LIDOCAINE HYDROCHLORIDE 20 MG/ML
INJECTION, SOLUTION EPIDURAL; INFILTRATION; INTRACAUDAL; PERINEURAL
Status: DISCONTINUED | OUTPATIENT
Start: 2023-08-10 | End: 2023-08-10

## 2023-08-10 RX ORDER — LABETALOL HYDROCHLORIDE 5 MG/ML
INJECTION, SOLUTION INTRAVENOUS
Status: DISCONTINUED | OUTPATIENT
Start: 2023-08-10 | End: 2023-08-10

## 2023-08-10 RX ORDER — SODIUM CHLORIDE, SODIUM LACTATE, POTASSIUM CHLORIDE, CALCIUM CHLORIDE 600; 310; 30; 20 MG/100ML; MG/100ML; MG/100ML; MG/100ML
INJECTION, SOLUTION INTRAVENOUS CONTINUOUS
Status: DISCONTINUED | OUTPATIENT
Start: 2023-08-10 | End: 2023-08-10 | Stop reason: HOSPADM

## 2023-08-10 RX ORDER — SODIUM CHLORIDE 9 MG/ML
INJECTION, SOLUTION INTRAVENOUS CONTINUOUS
Status: DISCONTINUED | OUTPATIENT
Start: 2023-08-10 | End: 2023-08-10 | Stop reason: HOSPADM

## 2023-08-10 RX ORDER — CEFAZOLIN SODIUM 1 G/3ML
INJECTION, POWDER, FOR SOLUTION INTRAMUSCULAR; INTRAVENOUS
Status: DISCONTINUED | OUTPATIENT
Start: 2023-08-10 | End: 2023-08-10

## 2023-08-10 RX ORDER — HYDROCODONE BITARTRATE AND ACETAMINOPHEN 5; 325 MG/1; MG/1
1 TABLET ORAL EVERY 6 HOURS PRN
Qty: 28 TABLET | Refills: 0 | Status: SHIPPED | OUTPATIENT
Start: 2023-08-10 | End: 2023-08-17

## 2023-08-10 RX ORDER — LEVALBUTEROL 1.25 MG/.5ML
1.25 SOLUTION, CONCENTRATE RESPIRATORY (INHALATION) ONCE
Status: DISCONTINUED | OUTPATIENT
Start: 2023-08-10 | End: 2023-08-10

## 2023-08-10 RX ORDER — MORPHINE SULFATE 10 MG/ML
4 INJECTION INTRAMUSCULAR; INTRAVENOUS; SUBCUTANEOUS EVERY 5 MIN PRN
Status: DISCONTINUED | OUTPATIENT
Start: 2023-08-10 | End: 2023-08-10 | Stop reason: HOSPADM

## 2023-08-10 RX ORDER — DIPHENHYDRAMINE HYDROCHLORIDE 50 MG/ML
25 INJECTION INTRAMUSCULAR; INTRAVENOUS EVERY 6 HOURS PRN
Status: DISCONTINUED | OUTPATIENT
Start: 2023-08-10 | End: 2023-08-10 | Stop reason: HOSPADM

## 2023-08-10 RX ORDER — ONDANSETRON 4 MG/1
8 TABLET, ORALLY DISINTEGRATING ORAL EVERY 8 HOURS PRN
Status: DISCONTINUED | OUTPATIENT
Start: 2023-08-10 | End: 2023-08-10 | Stop reason: HOSPADM

## 2023-08-10 RX ORDER — EPINEPHRINE 1 MG/ML
INJECTION, SOLUTION, CONCENTRATE INTRAVENOUS
Status: DISCONTINUED | OUTPATIENT
Start: 2023-08-10 | End: 2023-08-10 | Stop reason: HOSPADM

## 2023-08-10 RX ORDER — PROPOFOL 10 MG/ML
VIAL (ML) INTRAVENOUS
Status: DISCONTINUED | OUTPATIENT
Start: 2023-08-10 | End: 2023-08-10

## 2023-08-10 RX ORDER — BUPIVACAINE HYDROCHLORIDE 2.5 MG/ML
INJECTION, SOLUTION EPIDURAL; INFILTRATION; INTRACAUDAL
Status: DISCONTINUED | OUTPATIENT
Start: 2023-08-10 | End: 2023-08-10 | Stop reason: HOSPADM

## 2023-08-10 RX ORDER — PROMETHAZINE HYDROCHLORIDE 25 MG/1
25 TABLET ORAL EVERY 6 HOURS PRN
Status: DISCONTINUED | OUTPATIENT
Start: 2023-08-10 | End: 2023-08-10 | Stop reason: HOSPADM

## 2023-08-10 RX ORDER — LEVALBUTEROL INHALATION SOLUTION 1.25 MG/3ML
1.25 SOLUTION RESPIRATORY (INHALATION) ONCE
Status: COMPLETED | OUTPATIENT
Start: 2023-08-10 | End: 2023-08-10

## 2023-08-10 RX ORDER — DEXAMETHASONE SODIUM PHOSPHATE 4 MG/ML
INJECTION, SOLUTION INTRA-ARTICULAR; INTRALESIONAL; INTRAMUSCULAR; INTRAVENOUS; SOFT TISSUE
Status: DISCONTINUED | OUTPATIENT
Start: 2023-08-10 | End: 2023-08-10

## 2023-08-10 RX ADMIN — ROPIVACAINE HYDROCHLORIDE 30 ML: 7.5 INJECTION, SOLUTION EPIDURAL; PERINEURAL at 11:08

## 2023-08-10 RX ADMIN — LEVALBUTEROL HYDROCHLORIDE 1.25 MG: 1.25 SOLUTION RESPIRATORY (INHALATION) at 01:08

## 2023-08-10 RX ADMIN — SODIUM CHLORIDE: 0.9 INJECTION, SOLUTION INTRAVENOUS at 11:08

## 2023-08-10 RX ADMIN — PHENYLEPHRINE HYDROCHLORIDE 200 MCG: 10 INJECTION INTRAVENOUS at 12:08

## 2023-08-10 RX ADMIN — KETOROLAC TROMETHAMINE 60 MG: 30 INJECTION, SOLUTION INTRAMUSCULAR at 11:08

## 2023-08-10 RX ADMIN — ONDANSETRON 4 MG: 2 INJECTION INTRAMUSCULAR; INTRAVENOUS at 11:08

## 2023-08-10 RX ADMIN — FENTANYL CITRATE 50 MCG: 50 INJECTION INTRAMUSCULAR; INTRAVENOUS at 12:08

## 2023-08-10 RX ADMIN — GLYCOPYRROLATE 0.2 MG: 0.2 INJECTION INTRAMUSCULAR; INTRAVENOUS at 11:08

## 2023-08-10 RX ADMIN — PHENYLEPHRINE HYDROCHLORIDE 0.5 MCG/KG/MIN: 10 INJECTION INTRAVENOUS at 12:08

## 2023-08-10 RX ADMIN — PROPOFOL 30 MG: 10 INJECTION, EMULSION INTRAVENOUS at 11:08

## 2023-08-10 RX ADMIN — PROPOFOL 75 MG: 10 INJECTION, EMULSION INTRAVENOUS at 11:08

## 2023-08-10 RX ADMIN — LIDOCAINE HYDROCHLORIDE 60 MG: 20 INJECTION, SOLUTION EPIDURAL; INFILTRATION; INTRACAUDAL; PERINEURAL at 11:08

## 2023-08-10 RX ADMIN — SODIUM CHLORIDE: 9 INJECTION, SOLUTION INTRAVENOUS at 09:08

## 2023-08-10 RX ADMIN — DEXAMETHASONE SODIUM PHOSPHATE 8 MG: 4 INJECTION, SOLUTION INTRA-ARTICULAR; INTRALESIONAL; INTRAMUSCULAR; INTRAVENOUS; SOFT TISSUE at 11:08

## 2023-08-10 RX ADMIN — FENTANYL CITRATE 50 MCG: 50 INJECTION INTRAMUSCULAR; INTRAVENOUS at 11:08

## 2023-08-10 RX ADMIN — ROCURONIUM BROMIDE 20 MG: 10 INJECTION, SOLUTION INTRAVENOUS at 12:08

## 2023-08-10 RX ADMIN — ROCURONIUM BROMIDE 50 MG: 10 INJECTION, SOLUTION INTRAVENOUS at 11:08

## 2023-08-10 RX ADMIN — PHENYLEPHRINE HYDROCHLORIDE 100 MCG: 10 INJECTION INTRAVENOUS at 11:08

## 2023-08-10 RX ADMIN — LABETALOL HYDROCHLORIDE 12.5 MG: 5 INJECTION, SOLUTION INTRAVENOUS at 01:08

## 2023-08-10 RX ADMIN — SUGAMMADEX 200 MG: 100 INJECTION, SOLUTION INTRAVENOUS at 01:08

## 2023-08-10 RX ADMIN — CEFAZOLIN 2 G: 1 INJECTION, POWDER, FOR SOLUTION INTRAMUSCULAR; INTRAVENOUS; PARENTERAL at 11:08
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
## 2023-08-10 NOTE — ANESTHESIA PROCEDURE NOTES
Peripheral Block    Patient location during procedure: OR   Block not for primary anesthetic.  Reason for block: at surgeon's request and post-op pain management   Post-op Pain Location: rt shoulder scope   Start time: 8/10/2023 11:13 AM  Timeout: 8/10/2023 11:12 AM   End time: 8/10/2023 11:20 AM    Staffing  Authorizing Provider: Panfilo Cochran MD  Performing Provider: Panfilo Cochran MD    Staffing  Performed by: Panfilo Cochran MD  Authorized by: Panfilo Cochran MD    Preanesthetic Checklist  Completed: patient identified, IV checked, site marked, risks and benefits discussed, surgical consent, monitors and equipment checked, pre-op evaluation and timeout performed  Peripheral Block  Patient position: sitting  Prep: ChloraPrep  Patient monitoring: heart rate, continuous pulse ox, continuous capnometry, frequent blood pressure checks and cardiac monitor  Block type: interscalene  Laterality: right  Injection technique: single shot  Needle  Needle type: Stimuplex   Needle gauge: 20 G  Needle length: 2 in  Needle localization: nerve stimulator and ultrasound guidance   -ultrasound image captured on disc.  Assessment  Injection assessment: negative aspiration, negative parasthesia and local visualized surrounding nerve  Paresthesia pain: none  Heart rate change: no  Slow fractionated injection: yes  Pain Tolerance: comfortable throughout block  Medications:    Medications: ROPIvacaine (NAROPIN) injection 0.75% - Perineural   30 mL - 8/10/2023 11:17:00 AM

## 2023-08-10 NOTE — HPI
Chief complaint: Right shoulder pain  HPI:    Ora Sewell is a 72 y.o. female seen today for evaluation of right shoulder pain/dysfunction.  She is right-hand dominant.  She describes initial perceived injury proximally 3 months ago when she attempted to quickly toss a 5 lb bag of sugar onto the couch when she returned home from grocery shopping.  She felt a pop and experienced pain in her right shoulder.  She is surmised that her shoulder dislocated and relocated.  She describes 2nd injury 1 week later when she attempted to toss out a sheet over a bed.  She was living in home in [place] at the time.  She did not seek medical attention until recently.  She is had limited ability to actively abduct her shoulder.  She denies sensory changes in the hand.  She has had some neck pain and occasional perception of pain radiating down to her forearm.  She is on Xarelto for having undergone cardiac stent placement in 2018.  Her cardiologist is in [place], [place].  She has a component of night pain.  An MRI examination of the right shoulder was obtained 06/29/2023.  MRI demonstrates full-thickness retracted tears of the supraspinatus and infraspinatus tendons to the level of the mid humeral head.  There was no evidence of significant muscle atrophy.  Impression: Retracted rotator cuff tear-Right shoulder  Plan:Arthroscopic subacromial decompresion/rotator cuff repair- Right shoulder

## 2023-08-10 NOTE — TRANSFER OF CARE
"Anesthesia Transfer of Care Note    Patient: Ora Sewell    Procedure(s) Performed: Procedure(s) (LRB):  ARTHROSCOPY, SHOULDER (Right)  REPAIR, ROTATOR CUFF (Right)  ARTHROSCOPY, SHOULDER, WITH SUBACROMIAL SPACE DECOMPRESSION (Right)    Patient location: PACU    Anesthesia Type: general    Transport from OR: Transported from OR on 6-10 L/min O2 by face mask with adequate spontaneous ventilation    Post pain: adequate analgesia    Post assessment: no apparent anesthetic complications    Post vital signs: stable    Level of consciousness: responds to stimulation and awake    Nausea/Vomiting: no nausea/vomiting    Complications: none    Transfer of care protocol was followedComments: Good SV continue, NAD noted, tx HPTN with 12.5 mg Labetolol.  Good result noted.  See Anesthesia Record.  VSS, RTRN      Last vitals:   Visit Vitals  BP (!) 163/110 (BP Location: Left arm, Patient Position: Lying)   Pulse (!) 122   Temp 36.7 °C (98.1 °F) (Oral)   Resp (!) 29   Ht 5' 3" (1.6 m)   Wt 100.7 kg (222 lb)   SpO2 97%   Breastfeeding No   BMI 39.33 kg/m²     "

## 2023-08-10 NOTE — PROGRESS NOTES
1333 RECEIVED TO RR EASILY AROUSED. RESTLESS. HOB ELEVATED O2 VIA FM. DRESSING RIGHT SHOULDER D/I. ARMS LING RIGHT ARM. RIGHT RADIAL PULSE STRONG, RIGHT HAND WARM, PINK, NUMB SECONDARY TO NERVE BLOCK. I V INFUSING WELL LEFT FOREARM 20G. CATH. SCD HOSE IN PROGRESS. HEART RATE ELEVATED, A-FIB WITH RVR, B/P ELEVATED. DR. HOSKINS AT BEDSIDE. NEW ORDERS RECEIVED.    1344 TRANDATE GIVEN PER CRNA FOR HEART RATE AND B/P.    1350 X-RAYS DONE PER SUSSY SIBLEY. WELL.    1400 XOPENEX TREATMENT IN PROGRESS PER RESP. THERAPIST. B/P BETTER SEE FLOW SHEET. MORE AWAKE AND ALERT. NO C/O PAIN. NO MOVEMENT RIGHT ARM SECONDARY TO NERVE BLOCK.  NO DISTRESS NOTED.    1415 AWAKE, ALERT, TALKATIVE. NO C/O PAIN. NO DISTRESS NOTED. TRANSFERRED TO ROOM.

## 2023-08-10 NOTE — OP NOTE
[facility] - Orthopedic Periop Services  Surgery Department  Operative Note    SUMMARY     Date of Procedure: 8/10/2023     Procedure: Procedure(s) (LRB):  ARTHROSCOPY, SHOULDER (Right)  REPAIR, ROTATOR CUFF (Right)  ARTHROSCOPY, SHOULDER, WITH SUBACROMIAL SPACE DECOMPRESSION (Right)     Surgeon(s) and Role:     * Willian Cooper MD - Primary    Assisting Surgeon: None    Pre-Operative Diagnosis: Traumatic complete tear of right rotator cuff, initial encounter [S46.011A]  Impingement syndrome of right shoulder [M75.41]    Post-Operative Diagnosis: Post-Op Diagnosis Codes:     * Traumatic complete tear of right rotator cuff, initial encounter [S46.011A]     * Impingement syndrome of right shoulder [M75.41]    Anesthesia:  General/regional block        Procedure:   Right shoulder arthroscopy                        DEPARTMENT OF ORTHOPEDIC SURGERY                OPERATIVE REPORT     NAME:  Ora Sewell  MRN: [id number]     DATE OF SURGERY:  8/10/2023    PREOPERATIVE DIAGNOSIS: right shoulder internal derangement       POSTOPERATIVE DIAGNOSIS:  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  After adequate level of general anesthesia had been achieved (see anesthesia note) patients right shoulder was examined.  right shoulder normal contour.  There is full passive range of motion of the shoulder without instability signs.  right shoulder and upper extremity was scrubbed with Betadine and draped in sterile fashion.  The operation was begun by inflating  the joint with sterile saline through a posterior approach using an 18 gauge spinal needle.  Now a linear skin incision was made over the anterior aspect of the right shoulder for introduction of the blunt arthroscopy cannula.  Intraarticular positioning was confirmed with the arthroscopy camera. The anterior portal was established though an anterior incision made lateral to the coracoid process. The joint was entered through a trans-subscapularis muscle approach.  Inspection of glenohumeral joint revealed grade 3/4 DJD involving the articular surfaces.  There was degenerative tearing of the labrum.  The long head biceps tendon was absent.  There was massive full-thickness retracted tears of the supraspinatus and infraspinatus.  Debridement was performed with the shaver.  The blunt cannula was redirected from the posterior portal superiorly into the subacromial space.  Lateral portal was established.  A bursectomy was performed with the shaver.  Marked impingement of the rotator cuff tendons was identified beneath the acromion process and AC joint.  The coracoacromial ligament was taken down the radiofrequency Wand.  A Luis distal clavicle resection and acromioplasty was performed with a barrel bur.  Good subacromial decompression was confirmed with multiple portal viewing.  The remnant of the supraspinatus and infraspinatus rotator cuff tendons was inspected.  There was dystrophic thin tissue with delamination.  The tissue was retracted significantly and scarred down.  Decision was made not to attempt any rotator cuff repair.  The patient was advised prior to surgery that reverse total shoulder replacement arthroplasty may be in her future.  Now, the subacromial space was irrigated with saline solution and arthroscope withdrawn.  The stab wounds were reapproximated with interrupted 4-0 suture.  The wounds dressed sterilely and arm sling applied.  The patient was taken to the recovery room in satisfactory  condition.  ESTIMATED BLOOD LOSS:  5ccs.  The patient received Ancef antibiotic intravenously prior to the procedure.      Willian Cooper MD     Significant Surgical Tasks Conducted by the Assistant(s), if Applicable:     Complications: No    Estimated Blood Loss (EBL): * No values recorded between 8/10/2023 12:25 PM and 8/10/2023  1:32 PM *           Implants: * No implants in log *    Specimens:   Specimen (24h ago, onward)      None                    Condition: Good    Disposition: PACU - hemodynamically stable.    Attestation: I was present and scrubbed for the entire procedure.

## 2023-08-10 NOTE — ANESTHESIA PREPROCEDURE EVALUATION
08/10/2023  Ora Sewell is a 72 y.o., female.      Pre-op Assessment    I have reviewed the Patient Summary Reports.     I have reviewed the Nursing Notes. I have reviewed the NPO Status.   I have reviewed the Medications.     Review of Systems  Anesthesia Hx:  No problems with previous Anesthesia    Social:  Non-Smoker, No Alcohol Use    Hematology/Oncology:  Hematology Normal   Oncology Normal     EENT/Dental:EENT/Dental Normal   Cardiovascular:   Hypertension Past MI CAD  Dysrhythmias atrial fibrillation CHF MI, Coronary stent    bilat carotid endarterectomy   Pulmonary:   COPD, moderate Asthma Shortness of breath    Renal/:   Chronic Renal Disease, CKD    Hepatic/GI:  Hepatic/GI Normal    Musculoskeletal:  Musculoskeletal Normal    Neurological:  Neurology Normal    Endocrine:  Endocrine Normal  Obesity / BMI > 30  Dermatological:  Skin Normal    Psych:  Psychiatric Normal           Physical Exam  General: Well nourished    Airway:  Mallampati: III / III  Mouth Opening: Normal  TM Distance: > 6 cm  Tongue: Normal  Neck ROM: Normal ROM    Chest/Lungs:  Clear to auscultation, Normal Respiratory Rate    Heart:  Rate: Normal  Rhythm: Regular Rhythm        Anesthesia Plan  Type of Anesthesia, risks & benefits discussed:    Anesthesia Type: Gen ETT, Regional  Intra-op Monitoring Plan: Standard ASA Monitors  Post Op Pain Control Plan: multimodal analgesia  Induction:  IV  Informed Consent: Informed consent signed with the Patient and all parties understand the risks and agree with anesthesia plan.  All questions answered. Patient consented to blood products? Yes  ASA Score: 4  Day of Surgery Review of History & Physical: H&P Update referred to the surgeon/provider.I have interviewed and examined the patient. I have reviewed the patient's H&P dated: There are no significant changes.     Ready For Surgery From  Anesthesia Perspective.     .
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
## 2023-08-10 NOTE — DISCHARGE SUMMARY
[facility] - Orthopedic Periop Services  Discharge Note  Short Stay    Procedure(s) (LRB):  ARTHROSCOPY, SHOULDER (Right)  REPAIR, ROTATOR CUFF (Right)  ARTHROSCOPY, SHOULDER, WITH SUBACROMIAL SPACE DECOMPRESSION (Right)      OUTCOME: Patient tolerated treatment/procedure well without complication and is now ready for discharge.    DISPOSITION: Home or Self Care    FINAL DIAGNOSIS:  Traumatic complete tear of right rotator cuff    FOLLOWUP: In clinic    DISCHARGE INSTRUCTIONS:    Discharge Procedure Orders   Diet general     Keep surgical extremity elevated     Ice to affected area   Order Comments: using barrier between ice and skin (specify duration&frequency)     Change dressing (specify)   Order Comments: Dressing change: one time per day beginning 72 hours post op.     Call MD for:  temperature >100.4     Call MD for:  persistent nausea and vomiting     Call MD for:  severe uncontrolled pain     Call MD for:  difficulty breathing, headache or visual disturbances     Call MD for:  redness, tenderness, or signs of infection (pain, swelling, redness, odor or green/yellow discharge around incision site)     Call MD for:  hives     Call MD for:  persistent dizziness or light-headedness     Call MD for:  extreme fatigue     Remove dressing in 48 hours     Activity as tolerated     Shower on day dressing removed (No bath)     Weight bearing as tolerated         Clinical Reference Documents Added to Patient Instructions         Document    ROTATOR CUFF INJURY DISCHARGE INSTRUCTIONS (ENGLISH)    SHOULDER ARTHROSCOPY DISCHARGE INSTRUCTIONS (ENGLISH)            TIME SPENT ON DISCHARGE: 15 minutes

## 2023-08-10 NOTE — INTERVAL H&P NOTE
The patient has been examined and the H&P has been reviewed:    I concur with the findings and no changes have occurred since H&P was written.    Surgery risks, benefits and alternative options discussed and understood by patient/family.          Active Hospital Problems    Diagnosis  POA    *Traumatic complete tear of right rotator cuff [S46.011A]  Yes      Resolved Hospital Problems   No resolved problems to display.

## 2023-08-10 NOTE — ANESTHESIA PROCEDURE NOTES
Intubation    Date/Time: 8/10/2023 11:23 AM    Performed by: Asad Quiles CRNA  Authorized by: Panfilo Cochran MD    Intubation:     Induction:  Intravenous    Intubated:  Postinduction    Mask Ventilation:  Easy mask    Attempts:  1    Attempted By:  CRNA    Method of Intubation:  Direct    Blade:  Bobo 3    Laryngeal View Grade: Grade I - full view of cords      Difficult Airway Encountered?: No      Complications:  None    Airway Device:  Oral endotracheal tube    Airway Device Size:  7.5    Style/Cuff Inflation:  Cuffed (inflated to minimal occlusive pressure)    Inflation Amount (mL):  7    Tube secured:  21    Secured at:  The lips    Placement Verified By:  Capnometry    Complicating Factors:  Obesity    Findings Post-Intubation:  BS equal bilateral and atraumatic/condition of teeth unchanged  Notes:      Smooth, no trauma

## 2023-08-10 NOTE — BRIEF OP NOTE
[facility] - Orthopedic Periop Services  Brief Operative Note    Surgery Date: 8/10/2023     Surgeon(s) and Role:     * Willian Cooper MD - Primary    Assisting Surgeon: None    Pre-op Diagnosis:  Traumatic complete tear of right rotator cuff, initial encounter [S46.011A]  Impingement syndrome of right shoulder [M75.41]    Post-op Diagnosis:  Post-Op Diagnosis Codes:     * Traumatic complete tear of right rotator cuff, initial encounter [S46.011A]     * Impingement syndrome of right shoulder [M75.41]    Procedure(s) (LRB):  ARTHROSCOPY, SHOULDER (Right)  REPAIR, ROTATOR CUFF (Right)  ARTHROSCOPY, SHOULDER, WITH SUBACROMIAL SPACE DECOMPRESSION (Right)    Anesthesia: general/block    Description of the findings of the procedure(s): See Op Note     Estimated Blood Loss: * No values recorded between 8/10/2023 12:25 PM and 8/10/2023  1:32 PM *5cc         Specimens:   Specimen (24h ago, onward)      None              Discharge Note    OUTCOME: Patient tolerated treatment/procedure well without complication and is now ready for discharge.    DISPOSITION: Home or Self Care    FINAL DIAGNOSIS:  Traumatic complete tear of right rotator cuff    FOLLOWUP: In clinic    DISCHARGE INSTRUCTIONS:    Discharge Procedure Orders   Diet general     Keep surgical extremity elevated     Ice to affected area   Order Comments: using barrier between ice and skin (specify duration&frequency)     Change dressing (specify)   Order Comments: Dressing change: one time per day beginning 72 hours post op.     Call MD for:  temperature >100.4     Call MD for:  persistent nausea and vomiting     Call MD for:  severe uncontrolled pain     Call MD for:  difficulty breathing, headache or visual disturbances     Call MD for:  redness, tenderness, or signs of infection (pain, swelling, redness, odor or green/yellow discharge around incision site)     Call MD for:  hives     Call MD for:  persistent dizziness or light-headedness     Call MD  for:  extreme fatigue     Remove dressing in 48 hours     Activity as tolerated     Shower on day dressing removed (No bath)     Weight bearing as tolerated

## 2023-08-10 NOTE — DISCHARGE SUMMARY
[facility] - Orthopedic Periop Services  Discharge Note  Short Stay    Procedure(s) (LRB):  ARTHROSCOPY, SHOULDER (Right)  REPAIR, ROTATOR CUFF (Right)  ARTHROSCOPY, SHOULDER, WITH SUBACROMIAL SPACE DECOMPRESSION (Right)      OUTCOME: Patient tolerated treatment/procedure well without complication and is now ready for discharge.    DISPOSITION: Home or Self Care    FINAL DIAGNOSIS:  Traumatic complete tear of right rotator cuff    FOLLOWUP: In clinic    DISCHARGE INSTRUCTIONS:    Discharge Procedure Orders   Diet general     Keep surgical extremity elevated     Ice to affected area   Order Comments: using barrier between ice and skin (specify duration&frequency)     Change dressing (specify)   Order Comments: Dressing change: one time per day beginning 72 hours post op.     Call MD for:  temperature >100.4     Call MD for:  persistent nausea and vomiting     Call MD for:  severe uncontrolled pain     Call MD for:  difficulty breathing, headache or visual disturbances     Call MD for:  redness, tenderness, or signs of infection (pain, swelling, redness, odor or green/yellow discharge around incision site)     Call MD for:  hives     Call MD for:  persistent dizziness or light-headedness     Call MD for:  extreme fatigue     Remove dressing in 48 hours     Activity as tolerated     Shower on day dressing removed (No bath)     Weight bearing as tolerated        TIME SPENT ON DISCHARGE: 15 minutes

## 2023-08-11 NOTE — ANESTHESIA POSTPROCEDURE EVALUATION
Anesthesia Post Evaluation    Patient: Ora Sewell    Procedure(s) Performed: Procedure(s) (LRB):  ARTHROSCOPY, SHOULDER (Right)  REPAIR, ROTATOR CUFF (Right)  ARTHROSCOPY, SHOULDER, WITH SUBACROMIAL SPACE DECOMPRESSION (Right)    Final Anesthesia Type: general      Patient location during evaluation: PACU  Patient participation: Yes- Able to Participate  Level of consciousness: awake and sedated  Post-procedure vital signs: reviewed and stable  Pain management: adequate  Airway patency: patent    PONV status at discharge: No PONV  Anesthetic complications: no      Cardiovascular status: blood pressure returned to baseline  Respiratory status: unassisted  Hydration status: euvolemic  Follow-up not needed.          Vitals Value Taken Time   /73 08/10/23 1447   Temp 98 08/11/23 0648   Pulse 108 08/10/23 1508   Resp 20 08/10/23 1420   SpO2 92 % 08/10/23 1508   Vitals shown include unvalidated device data.      Event Time   Out of Recovery 14:15:00         Pain/Elvira Score: Elvira Score: 9 (8/10/2023  2:28 PM)  Modified Elvira Score: 19 (8/10/2023  2:28 PM)

## 2023-08-17 ENCOUNTER — OFFICE VISIT (OUTPATIENT)
Dept: ORTHOPEDICS | Facility: CLINIC | Age: 73
End: 2023-08-17
Payer: MEDICARE

## 2023-08-17 DIAGNOSIS — M19.011 PRIMARY OSTEOARTHRITIS OF RIGHT SHOULDER: ICD-10-CM

## 2023-08-17 DIAGNOSIS — M12.811 ROTATOR CUFF ARTHROPATHY OF RIGHT SHOULDER: ICD-10-CM

## 2023-08-17 DIAGNOSIS — Z98.890 S/P ARTHROSCOPY OF RIGHT SHOULDER: Primary | ICD-10-CM

## 2023-08-17 PROCEDURE — 99024 POSTOP FOLLOW-UP VISIT: CPT | Mod: ,,, | Performed by: NURSE PRACTITIONER

## 2023-08-17 PROCEDURE — 99213 OFFICE O/P EST LOW 20 MIN: CPT | Mod: PBBFAC | Performed by: NURSE PRACTITIONER

## 2023-08-17 PROCEDURE — 99024 PR POST-OP FOLLOW-UP VISIT: ICD-10-PCS | Mod: ,,, | Performed by: NURSE PRACTITIONER

## 2023-08-17 NOTE — PATIENT INSTRUCTIONS
Safety guidelines and activity restrictions are discussed with the patient.  She verbalizes understanding.  Informed family member that reverse total shoulder replacement takes planning as well as bringing personnel in for the procedure.  That normally if it is not scheduled a head of time is can not be performed the day of surgery.  Verbalizes understanding.  Sutures removed Steri-Strips applied.  She is instructed on Codman and pendulum exercises.  These were demonstrated to the patient.  She will begin wall walking exercises after 2 or 3 days.  We will have return orthopedic clinic in 3 weeks follow-up Dr. Cooper to discuss further treatment of her right shoulder sooner as needed.

## 2023-08-17 NOTE — PROGRESS NOTES
72-year-old female presents ambulatory to orthopedic clinic re-evaluation of her left shoulder.  She is known to Orthopedics having undergone left shoulder arthroscopy on 08/10/2023.  She was noted during the procedure to have grade 3/4 DJD of the glenohumeral joint.  There was degenerative tearing of the labral.  There is long head biceps tendon rupture.  There was a full-thickness retracted tear of the supraspinatus and infraspinatus tendons.  The infraspinatus and supraspinatus tendons are dystrophic thin tissue with delamination.  The tissue was retracted significantly scarred down.  She was not a candidate for attempted rotator cuff repair.  She was aware that she is facing reverse total shoulder replacement arthroplasty in the future.  Family member with her has questions as to why the reverse total shoulder replacement arthroplasty was not performed at the time of her shoulder arthroscopy.    PE:  Physical exam right shoulder shoulder has normal contour.  Portal sites are open to air.  Sutures are noted.  There is ecchymosis noted around the posterior portal site.  This is to be expected.  She has limited motion of her right shoulder.  Abduction is limited to approximately 70°.  Left radial pulse 2/4.  Capillary refill all digits left hand less than 3 seconds.      Impression:  7 days following right shoulder arthroscopy    Plan:  Safety guidelines and activity restrictions are discussed with the patient.  She verbalizes understanding.  Informed family member that reverse total shoulder replacement takes planning as well as bringing personnel in for the procedure.  That normally if it is not scheduled a head of time is can not be performed the day of surgery.  Verbalizes understanding.  Sutures removed Steri-Strips applied.  She is instructed on Codman and pendulum exercises.  These were demonstrated to the patient.  She will begin wall walking exercises after 2 or 3 days.  We will have return orthopedic  clinic in 3 weeks follow-up Dr. Cooper to discuss further treatment of her right shoulder sooner as needed.

## 2023-08-23 ENCOUNTER — HOSPITAL ENCOUNTER (EMERGENCY)
Facility: HOSPITAL | Age: 73
Discharge: ANOTHER HOSPITAL | End: 2023-08-23
Payer: MEDICARE

## 2023-08-23 ENCOUNTER — HOSPITAL ENCOUNTER (INPATIENT)
Facility: HOSPITAL | Age: 73
LOS: 3 days | Discharge: ANOTHER HOSPITAL | DRG: 215 | End: 2023-08-26
Attending: FAMILY MEDICINE | Admitting: HOSPITALIST
Payer: MEDICARE

## 2023-08-23 VITALS
OXYGEN SATURATION: 94 % | WEIGHT: 225 LBS | RESPIRATION RATE: 22 BRPM | HEIGHT: 66 IN | SYSTOLIC BLOOD PRESSURE: 122 MMHG | TEMPERATURE: 98 F | BODY MASS INDEX: 36.16 KG/M2 | HEART RATE: 130 BPM | DIASTOLIC BLOOD PRESSURE: 84 MMHG

## 2023-08-23 DIAGNOSIS — F12.90 CANNABINOID HYPEREMESIS SYNDROME: ICD-10-CM

## 2023-08-23 DIAGNOSIS — I50.22 CHRONIC SYSTOLIC HEART FAILURE: ICD-10-CM

## 2023-08-23 DIAGNOSIS — R07.9 CHEST PAIN: ICD-10-CM

## 2023-08-23 DIAGNOSIS — N17.9 AKI (ACUTE KIDNEY INJURY): ICD-10-CM

## 2023-08-23 DIAGNOSIS — E86.0 DEHYDRATION WITH HYPONATREMIA: ICD-10-CM

## 2023-08-23 DIAGNOSIS — I50.21 ACUTE SYSTOLIC HEART FAILURE: ICD-10-CM

## 2023-08-23 DIAGNOSIS — J69.0 ASPIRATION PNEUMONITIS: ICD-10-CM

## 2023-08-23 DIAGNOSIS — R00.0 TACHYCARDIA: ICD-10-CM

## 2023-08-23 DIAGNOSIS — E87.1 DEHYDRATION WITH HYPONATREMIA: ICD-10-CM

## 2023-08-23 DIAGNOSIS — I50.9 LOW CARDIAC OUTPUT SYNDROME: ICD-10-CM

## 2023-08-23 DIAGNOSIS — E87.6 HYPOKALEMIA: ICD-10-CM

## 2023-08-23 DIAGNOSIS — I24.89 DEMAND ISCHEMIA: ICD-10-CM

## 2023-08-23 DIAGNOSIS — I25.10 CORONARY ARTERY DISEASE, UNSPECIFIED VESSEL OR LESION TYPE, UNSPECIFIED WHETHER ANGINA PRESENT, UNSPECIFIED WHETHER NATIVE OR TRANSPLANTED HEART: ICD-10-CM

## 2023-08-23 DIAGNOSIS — E83.42 HYPOMAGNESEMIA: ICD-10-CM

## 2023-08-23 DIAGNOSIS — R11.2 CANNABINOID HYPEREMESIS SYNDROME: ICD-10-CM

## 2023-08-23 DIAGNOSIS — I48.91 ATRIAL FIBRILLATION WITH RAPID VENTRICULAR RESPONSE: ICD-10-CM

## 2023-08-23 DIAGNOSIS — I50.9 HEART FAILURE: ICD-10-CM

## 2023-08-23 DIAGNOSIS — R79.89 ELEVATED BRAIN NATRIURETIC PEPTIDE (BNP) LEVEL: ICD-10-CM

## 2023-08-23 DIAGNOSIS — I48.91 A-FIB: ICD-10-CM

## 2023-08-23 DIAGNOSIS — I48.91 ATRIAL FIBRILLATION WITH RVR: Primary | ICD-10-CM

## 2023-08-23 DIAGNOSIS — R57.0 CARDIOGENIC SHOCK: ICD-10-CM

## 2023-08-23 DIAGNOSIS — R06.00 DYSPNEA: ICD-10-CM

## 2023-08-23 DIAGNOSIS — J96.01 ACUTE RESPIRATORY FAILURE WITH HYPOXIA: ICD-10-CM

## 2023-08-23 DIAGNOSIS — R11.2 INTRACTABLE VOMITING WITH NAUSEA: Primary | ICD-10-CM

## 2023-08-23 DIAGNOSIS — D72.829 LEUKOCYTOSIS, UNSPECIFIED TYPE: ICD-10-CM

## 2023-08-23 DIAGNOSIS — R79.89 ELEVATED TROPONIN: ICD-10-CM

## 2023-08-23 DIAGNOSIS — E87.1 HYPONATREMIA: ICD-10-CM

## 2023-08-23 DIAGNOSIS — K72.00 SHOCK LIVER: ICD-10-CM

## 2023-08-23 LAB
ALBUMIN SERPL BCP-MCNC: 3.2 G/DL (ref 3.5–5)
ALBUMIN/GLOB SERPL: 0.8 {RATIO}
ALP SERPL-CCNC: 107 U/L (ref 55–142)
ALT SERPL W P-5'-P-CCNC: 16 U/L (ref 13–56)
ANION GAP SERPL CALCULATED.3IONS-SCNC: 20 MMOL/L (ref 7–16)
ANISOCYTOSIS BLD QL SMEAR: ABNORMAL
AST SERPL W P-5'-P-CCNC: 30 U/L (ref 15–37)
BASOPHILS # BLD AUTO: 0.01 K/UL (ref 0–0.2)
BASOPHILS NFR BLD AUTO: 0.1 % (ref 0–1)
BILIRUB SERPL-MCNC: 0.5 MG/DL (ref ?–1.2)
BUN SERPL-MCNC: 25 MG/DL (ref 7–18)
BUN/CREAT SERPL: 13 (ref 6–20)
CALCIUM SERPL-MCNC: 9.1 MG/DL (ref 8.5–10.1)
CHLORIDE SERPL-SCNC: 95 MMOL/L (ref 98–107)
CO2 SERPL-SCNC: 22 MMOL/L (ref 21–32)
CREAT SERPL-MCNC: 1.97 MG/DL (ref 0.55–1.02)
DIFFERENTIAL METHOD BLD: ABNORMAL
EGFR (NO RACE VARIABLE) (RUSH/TITUS): 26 ML/MIN/1.73M2
EOSINOPHIL # BLD AUTO: 0.01 K/UL (ref 0–0.5)
EOSINOPHIL NFR BLD AUTO: 0.1 % (ref 1–4)
ERYTHROCYTE [DISTWIDTH] IN BLOOD BY AUTOMATED COUNT: 19.4 % (ref 11.5–14.5)
GLOBULIN SER-MCNC: 3.8 G/DL (ref 2–4)
GLUCOSE SERPL-MCNC: 242 MG/DL (ref 74–106)
HCT VFR BLD AUTO: 46.5 % (ref 38–47)
HGB BLD-MCNC: 14.4 G/DL (ref 12–16)
LYMPHOCYTES # BLD AUTO: 1.25 K/UL (ref 1–4.8)
LYMPHOCYTES NFR BLD AUTO: 6.4 % (ref 27–41)
LYMPHOCYTES NFR BLD MANUAL: 7 % (ref 27–41)
MAGNESIUM SERPL-MCNC: 1.5 MG/DL (ref 1.7–2.3)
MCH RBC QN AUTO: 25.7 PG (ref 27–31)
MCHC RBC AUTO-ENTMCNC: 31 G/DL (ref 32–36)
MCV RBC AUTO: 82.9 FL (ref 80–96)
MONOCYTES # BLD AUTO: 1.39 K/UL (ref 0–0.8)
MONOCYTES NFR BLD AUTO: 7.1 % (ref 2–6)
MONOCYTES NFR BLD MANUAL: 6 % (ref 2–6)
MPC BLD CALC-MCNC: 10.7 FL (ref 9.4–12.4)
NEUTROPHILS # BLD AUTO: 16.84 K/UL (ref 1.8–7.7)
NEUTROPHILS NFR BLD AUTO: 86.3 % (ref 53–65)
NEUTS BAND NFR BLD MANUAL: 1 % (ref 1–5)
NEUTS SEG NFR BLD MANUAL: 86 % (ref 50–62)
NRBC BLD MANUAL-RTO: ABNORMAL %
NT-PROBNP SERPL-MCNC: ABNORMAL PG/ML (ref 1–450)
PLATELET # BLD AUTO: 485 K/UL (ref 150–400)
PLATELET MORPHOLOGY: ABNORMAL
POTASSIUM SERPL-SCNC: 3.2 MMOL/L (ref 3.5–5.1)
PROT SERPL-MCNC: 7 G/DL (ref 6.4–8.2)
RBC # BLD AUTO: 5.61 M/UL (ref 4.2–5.4)
SARS-COV-2 RDRP RESP QL NAA+PROBE: NEGATIVE
SODIUM SERPL-SCNC: 134 MMOL/L (ref 136–145)
TROPONIN I SERPL DL<=0.01 NG/ML-MCNC: 716.4 PG/ML
TROPONIN I SERPL DL<=0.01 NG/ML-MCNC: 716.6 PG/ML
WBC # BLD AUTO: 19.5 K/UL (ref 4.5–11)

## 2023-08-23 PROCEDURE — 96366 THER/PROPH/DIAG IV INF ADDON: CPT

## 2023-08-23 PROCEDURE — 99223 PR INITIAL HOSPITAL CARE,LEVL III: ICD-10-PCS | Mod: AI,GC,, | Performed by: HOSPITALIST

## 2023-08-23 PROCEDURE — 99285 EMERGENCY DEPT VISIT HI MDM: CPT | Mod: 25

## 2023-08-23 PROCEDURE — 96365 THER/PROPH/DIAG IV INF INIT: CPT

## 2023-08-23 PROCEDURE — 63600175 PHARM REV CODE 636 W HCPCS: Performed by: NURSE PRACTITIONER

## 2023-08-23 PROCEDURE — 25000003 PHARM REV CODE 250: Performed by: HOSPITALIST

## 2023-08-23 PROCEDURE — 96376 TX/PRO/DX INJ SAME DRUG ADON: CPT

## 2023-08-23 PROCEDURE — 99223 1ST HOSP IP/OBS HIGH 75: CPT | Mod: AI,GC,, | Performed by: HOSPITALIST

## 2023-08-23 PROCEDURE — 93010 ELECTROCARDIOGRAM REPORT: CPT | Performed by: HOSPITALIST

## 2023-08-23 PROCEDURE — 27000221 HC OXYGEN, UP TO 24 HOURS

## 2023-08-23 PROCEDURE — 96361 HYDRATE IV INFUSION ADD-ON: CPT

## 2023-08-23 PROCEDURE — 93005 ELECTROCARDIOGRAM TRACING: CPT

## 2023-08-23 PROCEDURE — 25000003 PHARM REV CODE 250: Performed by: GENERAL PRACTICE

## 2023-08-23 PROCEDURE — 99285 EMERGENCY DEPT VISIT HI MDM: CPT | Mod: GF

## 2023-08-23 PROCEDURE — 85025 COMPLETE CBC W/AUTO DIFF WBC: CPT | Performed by: NURSE PRACTITIONER

## 2023-08-23 PROCEDURE — 84484 ASSAY OF TROPONIN QUANT: CPT | Mod: 91 | Performed by: NURSE PRACTITIONER

## 2023-08-23 PROCEDURE — 83880 ASSAY OF NATRIURETIC PEPTIDE: CPT | Performed by: NURSE PRACTITIONER

## 2023-08-23 PROCEDURE — C9113 INJ PANTOPRAZOLE SODIUM, VIA: HCPCS | Performed by: NURSE PRACTITIONER

## 2023-08-23 PROCEDURE — 87635 SARS-COV-2 COVID-19 AMP PRB: CPT | Performed by: NURSE PRACTITIONER

## 2023-08-23 PROCEDURE — 83735 ASSAY OF MAGNESIUM: CPT | Performed by: NURSE PRACTITIONER

## 2023-08-23 PROCEDURE — 80053 COMPREHEN METABOLIC PANEL: CPT | Performed by: NURSE PRACTITIONER

## 2023-08-23 PROCEDURE — 25000003 PHARM REV CODE 250: Performed by: NURSE PRACTITIONER

## 2023-08-23 PROCEDURE — 94761 N-INVAS EAR/PLS OXIMETRY MLT: CPT

## 2023-08-23 PROCEDURE — 96375 TX/PRO/DX INJ NEW DRUG ADDON: CPT

## 2023-08-23 PROCEDURE — 63600175 PHARM REV CODE 636 W HCPCS: Performed by: HOSPITALIST

## 2023-08-23 PROCEDURE — 11000001 HC ACUTE MED/SURG PRIVATE ROOM

## 2023-08-23 PROCEDURE — 96368 THER/DIAG CONCURRENT INF: CPT

## 2023-08-23 RX ORDER — ENOXAPARIN SODIUM 100 MG/ML
40 INJECTION SUBCUTANEOUS EVERY 24 HOURS
Status: DISCONTINUED | OUTPATIENT
Start: 2023-08-24 | End: 2023-08-23

## 2023-08-23 RX ORDER — NAPROXEN SODIUM 220 MG/1
324 TABLET, FILM COATED ORAL
Status: COMPLETED | OUTPATIENT
Start: 2023-08-23 | End: 2023-08-23

## 2023-08-23 RX ORDER — HYDROCODONE BITARTRATE AND ACETAMINOPHEN 5; 325 MG/1; MG/1
1 TABLET ORAL EVERY 6 HOURS PRN
COMMUNITY

## 2023-08-23 RX ORDER — IBUPROFEN 200 MG
24 TABLET ORAL
Status: CANCELLED | OUTPATIENT
Start: 2023-08-23

## 2023-08-23 RX ORDER — METOPROLOL TARTRATE 25 MG/1
50 TABLET, FILM COATED ORAL
Status: COMPLETED | OUTPATIENT
Start: 2023-08-23 | End: 2023-08-23

## 2023-08-23 RX ORDER — NALOXONE HCL 0.4 MG/ML
0.02 VIAL (ML) INJECTION
Status: DISCONTINUED | OUTPATIENT
Start: 2023-08-23 | End: 2023-08-23

## 2023-08-23 RX ORDER — SODIUM CHLORIDE 0.9 % (FLUSH) 0.9 %
10 SYRINGE (ML) INJECTION EVERY 12 HOURS PRN
Status: DISCONTINUED | OUTPATIENT
Start: 2023-08-23 | End: 2023-08-26 | Stop reason: HOSPADM

## 2023-08-23 RX ORDER — IBUPROFEN 200 MG
16 TABLET ORAL
Status: CANCELLED | OUTPATIENT
Start: 2023-08-23

## 2023-08-23 RX ORDER — PRAVASTATIN SODIUM 40 MG/1
80 TABLET ORAL DAILY
Status: DISCONTINUED | OUTPATIENT
Start: 2023-08-24 | End: 2023-08-25

## 2023-08-23 RX ORDER — GLUCAGON 1 MG
1 KIT INJECTION
Status: CANCELLED | OUTPATIENT
Start: 2023-08-23

## 2023-08-23 RX ORDER — PANTOPRAZOLE SODIUM 40 MG/1
40 TABLET, DELAYED RELEASE ORAL DAILY
Status: DISCONTINUED | OUTPATIENT
Start: 2023-08-24 | End: 2023-08-23

## 2023-08-23 RX ORDER — MAGNESIUM SULFATE HEPTAHYDRATE 40 MG/ML
2 INJECTION, SOLUTION INTRAVENOUS
Status: COMPLETED | OUTPATIENT
Start: 2023-08-23 | End: 2023-08-23

## 2023-08-23 RX ORDER — METOPROLOL SUCCINATE 50 MG/1
50 TABLET, EXTENDED RELEASE ORAL 2 TIMES DAILY
Status: DISCONTINUED | OUTPATIENT
Start: 2023-08-23 | End: 2023-08-24

## 2023-08-23 RX ORDER — DIPHENHYDRAMINE HYDROCHLORIDE 50 MG/ML
50 INJECTION INTRAMUSCULAR; INTRAVENOUS ONCE
Status: COMPLETED | OUTPATIENT
Start: 2023-08-23 | End: 2023-08-24

## 2023-08-23 RX ORDER — PANTOPRAZOLE SODIUM 40 MG/10ML
40 INJECTION, POWDER, LYOPHILIZED, FOR SOLUTION INTRAVENOUS DAILY
Status: DISCONTINUED | OUTPATIENT
Start: 2023-08-24 | End: 2023-08-26 | Stop reason: HOSPADM

## 2023-08-23 RX ORDER — POTASSIUM CHLORIDE 7.45 MG/ML
40 INJECTION INTRAVENOUS ONCE
Status: COMPLETED | OUTPATIENT
Start: 2023-08-23 | End: 2023-08-23

## 2023-08-23 RX ORDER — NIFEDIPINE 60 MG/1
60 TABLET, EXTENDED RELEASE ORAL DAILY
Status: DISCONTINUED | OUTPATIENT
Start: 2023-08-24 | End: 2023-08-24

## 2023-08-23 RX ORDER — VENLAFAXINE HYDROCHLORIDE 75 MG/1
150 CAPSULE, EXTENDED RELEASE ORAL DAILY
Status: DISCONTINUED | OUTPATIENT
Start: 2023-08-24 | End: 2023-08-26 | Stop reason: HOSPADM

## 2023-08-23 RX ORDER — TALC
6 POWDER (GRAM) TOPICAL NIGHTLY PRN
Status: DISCONTINUED | OUTPATIENT
Start: 2023-08-24 | End: 2023-08-26 | Stop reason: HOSPADM

## 2023-08-23 RX ORDER — DILTIAZEM HYDROCHLORIDE 5 MG/ML
10 INJECTION INTRAVENOUS
Status: COMPLETED | OUTPATIENT
Start: 2023-08-23 | End: 2023-08-23

## 2023-08-23 RX ORDER — METOCLOPRAMIDE HYDROCHLORIDE 5 MG/ML
10 INJECTION INTRAMUSCULAR; INTRAVENOUS EVERY 6 HOURS PRN
Status: DISCONTINUED | OUTPATIENT
Start: 2023-08-23 | End: 2023-08-24

## 2023-08-23 RX ORDER — POLYETHYLENE GLYCOL 3350 17 G/17G
17 POWDER, FOR SOLUTION ORAL 2 TIMES DAILY PRN
Status: DISCONTINUED | OUTPATIENT
Start: 2023-08-23 | End: 2023-08-26 | Stop reason: HOSPADM

## 2023-08-23 RX ORDER — PANTOPRAZOLE SODIUM 40 MG/10ML
40 INJECTION, POWDER, LYOPHILIZED, FOR SOLUTION INTRAVENOUS
Status: COMPLETED | OUTPATIENT
Start: 2023-08-23 | End: 2023-08-23

## 2023-08-23 RX ORDER — ACETAMINOPHEN 500 MG
1000 TABLET ORAL EVERY 6 HOURS PRN
Status: DISCONTINUED | OUTPATIENT
Start: 2023-08-23 | End: 2023-08-24

## 2023-08-23 RX ORDER — DILTIAZEM HYDROCHLORIDE 5 MG/ML
25 INJECTION INTRAVENOUS
Status: COMPLETED | OUTPATIENT
Start: 2023-08-23 | End: 2023-08-23

## 2023-08-23 RX ORDER — FUROSEMIDE 10 MG/ML
20 INJECTION INTRAMUSCULAR; INTRAVENOUS
Status: COMPLETED | OUTPATIENT
Start: 2023-08-23 | End: 2023-08-23

## 2023-08-23 RX ORDER — HYDROCODONE BITARTRATE AND ACETAMINOPHEN 5; 325 MG/1; MG/1
1 TABLET ORAL EVERY 6 HOURS PRN
Status: DISCONTINUED | OUTPATIENT
Start: 2023-08-23 | End: 2023-08-26 | Stop reason: HOSPADM

## 2023-08-23 RX ORDER — MORPHINE SULFATE 4 MG/ML
4 INJECTION, SOLUTION INTRAMUSCULAR; INTRAVENOUS ONCE
Status: COMPLETED | OUTPATIENT
Start: 2023-08-23 | End: 2023-08-24

## 2023-08-23 RX ORDER — SIMETHICONE 80 MG
1 TABLET,CHEWABLE ORAL 4 TIMES DAILY PRN
Status: DISCONTINUED | OUTPATIENT
Start: 2023-08-23 | End: 2023-08-26 | Stop reason: HOSPADM

## 2023-08-23 RX ADMIN — DILTIAZEM HYDROCHLORIDE 15 MG/HR: 5 INJECTION INTRAVENOUS at 01:08

## 2023-08-23 RX ADMIN — SODIUM CHLORIDE 1000 ML: 9 INJECTION, SOLUTION INTRAVENOUS at 09:08

## 2023-08-23 RX ADMIN — ASPIRIN 81 MG 324 MG: 81 TABLET ORAL at 01:08

## 2023-08-23 RX ADMIN — FUROSEMIDE 20 MG: 10 INJECTION, SOLUTION INTRAMUSCULAR; INTRAVENOUS at 05:08

## 2023-08-23 RX ADMIN — PANTOPRAZOLE SODIUM 40 MG: 40 INJECTION, POWDER, LYOPHILIZED, FOR SOLUTION INTRAVENOUS at 01:08

## 2023-08-23 RX ADMIN — SODIUM CHLORIDE 1000 ML: 9 INJECTION, SOLUTION INTRAVENOUS at 12:08

## 2023-08-23 RX ADMIN — METOPROLOL TARTRATE 50 MG: 25 TABLET, FILM COATED ORAL at 02:08

## 2023-08-23 RX ADMIN — POTASSIUM CHLORIDE 40 MEQ: 7.46 INJECTION, SOLUTION INTRAVENOUS at 09:08

## 2023-08-23 RX ADMIN — DILTIAZEM HYDROCHLORIDE 10 MG: 5 INJECTION INTRAVENOUS at 12:08

## 2023-08-23 RX ADMIN — MAGNESIUM SULFATE 2 G: 2 INJECTION INTRAVENOUS at 01:08

## 2023-08-23 RX ADMIN — CEFTRIAXONE SODIUM 1 G: 1 INJECTION, POWDER, FOR SOLUTION INTRAMUSCULAR; INTRAVENOUS at 01:08

## 2023-08-23 RX ADMIN — DILTIAZEM HYDROCHLORIDE 25 MG: 5 INJECTION INTRAVENOUS at 01:08

## 2023-08-23 RX ADMIN — METOPROLOL SUCCINATE 50 MG: 50 TABLET, EXTENDED RELEASE ORAL at 09:08
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
## 2023-08-23 NOTE — Clinical Note
Patient transported to [facility] via stretcher.  Patient tolerated well.  Bedside report and assessment with DELORES Holliday.  Puncture sites stable.  No signs of bleeding or hematoma.  TR band in place.  Dressing in place.  Pulse palpable.  VS stable.  Patient education complete.

## 2023-08-23 NOTE — Clinical Note
The right groin, right radial and right neck was prepped. The site was prepped with ChloraPrep. The site was clipped. The patient was draped.

## 2023-08-23 NOTE — Clinical Note
Patient: Malick Cedillo    Procedure: Procedure(s):  Left total knee arthroplasty (General with abductor block)       Anesthesia Type:  General    Note:  Disposition: Inpatient   Postop Pain Control: Uneventful            Sign Out: Well controlled pain   PONV: No   Neuro/Psych: Uneventful            Sign Out: Acceptable/Baseline neuro status   Airway/Respiratory: Uneventful            Sign Out: Acceptable/Baseline resp. status   CV/Hemodynamics: Uneventful            Sign Out: Acceptable CV status; No obvious hypovolemia; No obvious fluid overload   Other NRE: NONE   DID A NON-ROUTINE EVENT OCCUR? No           Last vitals:  Vitals Value Taken Time   /110 10/31/22 1025   Temp 98.1  F (36.7  C) 10/31/22 1005   Pulse 89 10/31/22 1041   Resp 11 10/31/22 1041   SpO2 97 % 10/31/22 1041   Vitals shown include unvalidated device data.    Electronically Signed By: Roe Burgos MD  October 31, 2022  10:41 AM   The defib pads were placed on the patient's anterior chest and left lateral chest wall

## 2023-08-23 NOTE — Clinical Note
The catheter was removed from the ostium   right coronary artery. bilateral LE Active ROM was WFL  (within functional limits)/bilateral LE Passive ROM was WFL  (within functional limits)

## 2023-08-23 NOTE — Clinical Note
The catheter insertion attempt was made into the and was inserted over the wire into the left ventricle. The catheter was unable to access the area..

## 2023-08-24 PROBLEM — F12.90 CANNABINOID HYPEREMESIS SYNDROME: Status: ACTIVE | Noted: 2023-08-23

## 2023-08-24 PROBLEM — R79.89 ELEVATED TROPONIN: Status: ACTIVE | Noted: 2023-08-24

## 2023-08-24 PROBLEM — I50.9 LOW CARDIAC OUTPUT SYNDROME: Status: ACTIVE | Noted: 2023-08-24

## 2023-08-24 PROBLEM — I50.21 ACUTE SYSTOLIC HEART FAILURE: Status: ACTIVE | Noted: 2023-08-24

## 2023-08-24 PROBLEM — E87.6 HYPOKALEMIA: Status: ACTIVE | Noted: 2023-08-24

## 2023-08-24 PROBLEM — I25.10 CAD (CORONARY ARTERY DISEASE): Status: ACTIVE | Noted: 2023-08-24

## 2023-08-24 PROBLEM — E83.42 HYPOMAGNESEMIA: Status: ACTIVE | Noted: 2023-08-24

## 2023-08-24 PROBLEM — R79.89 ELEVATED BRAIN NATRIURETIC PEPTIDE (BNP) LEVEL: Status: ACTIVE | Noted: 2023-08-24

## 2023-08-24 LAB
ANION GAP SERPL CALCULATED.3IONS-SCNC: 16 MMOL/L (ref 7–16)
AORTIC ROOT ANNULUS: 2.51 CM
AORTIC VALVE CUSP SEPERATION: 1.68 CM
AV INDEX (PROSTH): 0.96
AV MEAN GRADIENT: 4 MMHG
AV PEAK GRADIENT: 7 MMHG
AV VALVE AREA BY VELOCITY RATIO: 1.57 CM²
AV VALVE AREA: 1.68 CM²
AV VELOCITY RATIO: 0.9
BASOPHILS # BLD AUTO: 0.04 K/UL (ref 0–0.2)
BASOPHILS NFR BLD AUTO: 0.2 % (ref 0–1)
BSA FOR ECHO PROCEDURE: 2.2 M2
BUN SERPL-MCNC: 31 MG/DL (ref 7–18)
BUN/CREAT SERPL: 17 (ref 6–20)
CALCIUM SERPL-MCNC: 8.6 MG/DL (ref 8.5–10.1)
CHLORIDE SERPL-SCNC: 97 MMOL/L (ref 98–107)
CO2 SERPL-SCNC: 25 MMOL/L (ref 21–32)
CREAT SERPL-MCNC: 1.87 MG/DL (ref 0.55–1.02)
CV ECHO LV RWT: 0.62 CM
DIFFERENTIAL METHOD BLD: ABNORMAL
DOP CALC AO PEAK VEL: 1.31 M/S
DOP CALC AO VTI: 18.2 CM
DOP CALC LVOT AREA: 1.7 CM2
DOP CALC LVOT DIAMETER: 1.49 CM
DOP CALC LVOT PEAK VEL: 1.18 M/S
DOP CALC LVOT STROKE VOLUME: 30.5 CM3
DOP CALC MV VTI: 29.9 CM
DOP CALCLVOT PEAK VEL VTI: 17.5 CM
E WAVE DECELERATION TIME: 224.97 MSEC
ECHO LV POSTERIOR WALL: 1.41 CM (ref 0.6–1.1)
EGFR (NO RACE VARIABLE) (RUSH/TITUS): 28 ML/MIN/1.73M2
EJECTION FRACTION: 20 %
EOSINOPHIL # BLD AUTO: 0.01 K/UL (ref 0–0.5)
EOSINOPHIL NFR BLD AUTO: 0 % (ref 1–4)
ERYTHROCYTE [DISTWIDTH] IN BLOOD BY AUTOMATED COUNT: 17.5 % (ref 11.5–14.5)
EST. AVERAGE GLUCOSE BLD GHB EST-MCNC: 127 MG/DL
FRACTIONAL SHORTENING: 14 % (ref 28–44)
GLUCOSE SERPL-MCNC: 106 MG/DL (ref 74–106)
HBA1C MFR BLD HPLC: 6.4 % (ref 4.5–6.6)
HCT VFR BLD AUTO: 42.2 % (ref 38–47)
HGB BLD-MCNC: 13.3 G/DL (ref 12–16)
IMM GRANULOCYTES # BLD AUTO: 0.31 K/UL (ref 0–0.04)
IMM GRANULOCYTES NFR BLD: 1.5 % (ref 0–0.4)
INTERVENTRICULAR SEPTUM: 1.18 CM (ref 0.6–1.1)
IVC DIAMETER: 1.92 CM
LA MAJOR: 3.87 CM
LACTATE SERPL-SCNC: 10.6 MMOL/L (ref 0.4–2)
LACTATE SERPL-SCNC: 3.1 MMOL/L (ref 0.4–2)
LACTATE SERPL-SCNC: 3.8 MMOL/L (ref 0.4–2)
LACTATE SERPL-SCNC: 7.5 MMOL/L (ref 0.4–2)
LEFT ATRIUM SIZE: 3.95 CM
LEFT INTERNAL DIMENSION IN SYSTOLE: 3.93 CM (ref 2.1–4)
LEFT VENTRICLE DIASTOLIC VOLUME INDEX: 44.97 ML/M2
LEFT VENTRICLE DIASTOLIC VOLUME: 95.34 ML
LEFT VENTRICLE MASS INDEX: 107 G/M2
LEFT VENTRICLE SYSTOLIC VOLUME INDEX: 31.7 ML/M2
LEFT VENTRICLE SYSTOLIC VOLUME: 67.3 ML
LEFT VENTRICULAR INTERNAL DIMENSION IN DIASTOLE: 4.56 CM (ref 3.5–6)
LEFT VENTRICULAR MASS: 225.83 G
LVOT MG: 2.41 MMHG
LVOT MV: 0.71 CM/S
LYMPHOCYTES # BLD AUTO: 2.62 K/UL (ref 1–4.8)
LYMPHOCYTES NFR BLD AUTO: 13 % (ref 27–41)
MAGNESIUM SERPL-MCNC: 2.3 MG/DL (ref 1.7–2.3)
MCH RBC QN AUTO: 25.4 PG (ref 27–31)
MCHC RBC AUTO-ENTMCNC: 31.5 G/DL (ref 32–36)
MCV RBC AUTO: 80.5 FL (ref 80–96)
MONOCYTES # BLD AUTO: 1.73 K/UL (ref 0–0.8)
MONOCYTES NFR BLD AUTO: 8.6 % (ref 2–6)
MPC BLD CALC-MCNC: 11 FL (ref 9.4–12.4)
MV MEAN GRADIENT: 6 MMHG
MV PEAK GRADIENT: 13 MMHG
MV STENOSIS PRESSURE HALF TIME: 65.24 MS
MV VALVE AREA BY CONTINUITY EQUATION: 1.02 CM2
MV VALVE AREA P 1/2 METHOD: 3.37 CM2
NEUTROPHILS # BLD AUTO: 15.46 K/UL (ref 1.8–7.7)
NEUTROPHILS NFR BLD AUTO: 76.7 % (ref 53–65)
NRBC # BLD AUTO: 0.04 X10E3/UL
NRBC, AUTO (.00): 0.2 %
PISA MRMAX VEL: 4.79 M/S
PISA TR MAX VEL: 2.95 M/S
PLATELET # BLD AUTO: 379 K/UL (ref 150–400)
POTASSIUM SERPL-SCNC: 4.4 MMOL/L (ref 3.5–5.1)
PV PEAK GRADIENT: 2 MMHG
PV PEAK VELOCITY: 0.78 M/S
RA MAJOR: 3.77 CM
RA PRESSURE ESTIMATED: 15 MMHG
RBC # BLD AUTO: 5.24 M/UL (ref 4.2–5.4)
RIGHT VENTRICULAR END-DIASTOLIC DIMENSION: 2.88 CM
RV TB RVSP: 18 MMHG
SODIUM SERPL-SCNC: 134 MMOL/L (ref 136–145)
TDI LATERAL: 0.12 M/S
TDI SEPTAL: 0.06 M/S
TDI: 0.09 M/S
TR MAX PG: 35 MMHG
TRICUSPID ANNULAR PLANE SYSTOLIC EXCURSION: 1.13 CM
TROPONIN I SERPL DL<=0.01 NG/ML-MCNC: 176.7 PG/ML
TSH SERPL DL<=0.005 MIU/L-ACNC: 1.81 UIU/ML (ref 0.36–3.74)
TV REST PULMONARY ARTERY PRESSURE: 50 MMHG
WBC # BLD AUTO: 20.17 K/UL (ref 4.5–11)
Z-SCORE OF LEFT VENTRICULAR DIMENSION IN END DIASTOLE: -3.85
Z-SCORE OF LEFT VENTRICULAR DIMENSION IN END SYSTOLE: -0.31

## 2023-08-24 PROCEDURE — 31500 INSERT EMERGENCY AIRWAY: CPT | Mod: ,,, | Performed by: HOSPITALIST

## 2023-08-24 PROCEDURE — 63600175 PHARM REV CODE 636 W HCPCS: Performed by: HOSPITALIST

## 2023-08-24 PROCEDURE — 25000003 PHARM REV CODE 250: Performed by: HOSPITALIST

## 2023-08-24 PROCEDURE — 94761 N-INVAS EAR/PLS OXIMETRY MLT: CPT

## 2023-08-24 PROCEDURE — 83605 ASSAY OF LACTIC ACID: CPT | Performed by: STUDENT IN AN ORGANIZED HEALTH CARE EDUCATION/TRAINING PROGRAM

## 2023-08-24 PROCEDURE — 99223 1ST HOSP IP/OBS HIGH 75: CPT | Mod: ,,, | Performed by: INTERNAL MEDICINE

## 2023-08-24 PROCEDURE — 63600175 PHARM REV CODE 636 W HCPCS: Performed by: STUDENT IN AN ORGANIZED HEALTH CARE EDUCATION/TRAINING PROGRAM

## 2023-08-24 PROCEDURE — 20000000 HC ICU ROOM

## 2023-08-24 PROCEDURE — 99233 SBSQ HOSP IP/OBS HIGH 50: CPT | Mod: ,,, | Performed by: HOSPITALIST

## 2023-08-24 PROCEDURE — 99223 PR INITIAL HOSPITAL CARE,LEVL III: ICD-10-PCS | Mod: ,,, | Performed by: INTERNAL MEDICINE

## 2023-08-24 PROCEDURE — 93460 PR CATH PLACE/CORON ANGIO, IMG SUPER/INTERP,R&L HRT CATH, L HRT VENTRIC: ICD-10-PCS | Mod: 26,,, | Performed by: STUDENT IN AN ORGANIZED HEALTH CARE EDUCATION/TRAINING PROGRAM

## 2023-08-24 PROCEDURE — 84443 ASSAY THYROID STIM HORMONE: CPT | Performed by: GENERAL PRACTICE

## 2023-08-24 PROCEDURE — 99153 MOD SED SAME PHYS/QHP EA: CPT | Performed by: STUDENT IN AN ORGANIZED HEALTH CARE EDUCATION/TRAINING PROGRAM

## 2023-08-24 PROCEDURE — 93460 R&L HRT ART/VENTRICLE ANGIO: CPT | Mod: 26,,, | Performed by: STUDENT IN AN ORGANIZED HEALTH CARE EDUCATION/TRAINING PROGRAM

## 2023-08-24 PROCEDURE — 94002 VENT MGMT INPAT INIT DAY: CPT

## 2023-08-24 PROCEDURE — 83036 HEMOGLOBIN GLYCOSYLATED A1C: CPT | Performed by: GENERAL PRACTICE

## 2023-08-24 PROCEDURE — 99152 PR MOD CONSCIOUS SEDATION, SAME PHYS, 5+ YRS, FIRST 15 MIN: ICD-10-PCS | Mod: ,,, | Performed by: STUDENT IN AN ORGANIZED HEALTH CARE EDUCATION/TRAINING PROGRAM

## 2023-08-24 PROCEDURE — C1760 CLOSURE DEV, VASC: HCPCS | Performed by: STUDENT IN AN ORGANIZED HEALTH CARE EDUCATION/TRAINING PROGRAM

## 2023-08-24 PROCEDURE — 93460 R&L HRT ART/VENTRICLE ANGIO: CPT | Performed by: STUDENT IN AN ORGANIZED HEALTH CARE EDUCATION/TRAINING PROGRAM

## 2023-08-24 PROCEDURE — C9113 INJ PANTOPRAZOLE SODIUM, VIA: HCPCS | Performed by: HOSPITALIST

## 2023-08-24 PROCEDURE — 80048 BASIC METABOLIC PNL TOTAL CA: CPT | Performed by: GENERAL PRACTICE

## 2023-08-24 PROCEDURE — 31500 PR INSERT, EMERGENCY ENDOTRACH AIRWAY: ICD-10-PCS | Mod: ,,, | Performed by: HOSPITALIST

## 2023-08-24 PROCEDURE — 25000003 PHARM REV CODE 250

## 2023-08-24 PROCEDURE — C1769 GUIDE WIRE: HCPCS | Performed by: STUDENT IN AN ORGANIZED HEALTH CARE EDUCATION/TRAINING PROGRAM

## 2023-08-24 PROCEDURE — C1751 CATH, INF, PER/CENT/MIDLINE: HCPCS | Performed by: STUDENT IN AN ORGANIZED HEALTH CARE EDUCATION/TRAINING PROGRAM

## 2023-08-24 PROCEDURE — C1894 INTRO/SHEATH, NON-LASER: HCPCS | Performed by: STUDENT IN AN ORGANIZED HEALTH CARE EDUCATION/TRAINING PROGRAM

## 2023-08-24 PROCEDURE — 27201423 OPTIME MED/SURG SUP & DEVICES STERILE SUPPLY: Performed by: STUDENT IN AN ORGANIZED HEALTH CARE EDUCATION/TRAINING PROGRAM

## 2023-08-24 PROCEDURE — 85025 COMPLETE CBC W/AUTO DIFF WBC: CPT | Performed by: GENERAL PRACTICE

## 2023-08-24 PROCEDURE — 84484 ASSAY OF TROPONIN QUANT: CPT | Performed by: GENERAL PRACTICE

## 2023-08-24 PROCEDURE — 25000003 PHARM REV CODE 250: Performed by: STUDENT IN AN ORGANIZED HEALTH CARE EDUCATION/TRAINING PROGRAM

## 2023-08-24 PROCEDURE — 99900035 HC TECH TIME PER 15 MIN (STAT)

## 2023-08-24 PROCEDURE — 99152 MOD SED SAME PHYS/QHP 5/>YRS: CPT | Performed by: STUDENT IN AN ORGANIZED HEALTH CARE EDUCATION/TRAINING PROGRAM

## 2023-08-24 PROCEDURE — C1887 CATHETER, GUIDING: HCPCS | Performed by: STUDENT IN AN ORGANIZED HEALTH CARE EDUCATION/TRAINING PROGRAM

## 2023-08-24 PROCEDURE — 25000003 PHARM REV CODE 250: Performed by: GENERAL PRACTICE

## 2023-08-24 PROCEDURE — 25500020 PHARM REV CODE 255: Performed by: STUDENT IN AN ORGANIZED HEALTH CARE EDUCATION/TRAINING PROGRAM

## 2023-08-24 PROCEDURE — 99223 PR INITIAL HOSPITAL CARE,LEVL III: ICD-10-PCS | Mod: 25,,, | Performed by: STUDENT IN AN ORGANIZED HEALTH CARE EDUCATION/TRAINING PROGRAM

## 2023-08-24 PROCEDURE — 99233 PR SUBSEQUENT HOSPITAL CARE,LEVL III: ICD-10-PCS | Mod: ,,, | Performed by: HOSPITALIST

## 2023-08-24 PROCEDURE — 99152 MOD SED SAME PHYS/QHP 5/>YRS: CPT | Mod: ,,, | Performed by: STUDENT IN AN ORGANIZED HEALTH CARE EDUCATION/TRAINING PROGRAM

## 2023-08-24 PROCEDURE — 83735 ASSAY OF MAGNESIUM: CPT | Performed by: GENERAL PRACTICE

## 2023-08-24 PROCEDURE — 27000221 HC OXYGEN, UP TO 24 HOURS

## 2023-08-24 PROCEDURE — 99223 1ST HOSP IP/OBS HIGH 75: CPT | Mod: 25,,, | Performed by: STUDENT IN AN ORGANIZED HEALTH CARE EDUCATION/TRAINING PROGRAM

## 2023-08-24 RX ORDER — DIGOXIN 0.25 MG/ML
500 INJECTION INTRAMUSCULAR; INTRAVENOUS ONCE
Status: DISCONTINUED | OUTPATIENT
Start: 2023-08-24 | End: 2023-08-25

## 2023-08-24 RX ORDER — ONDANSETRON 4 MG/1
8 TABLET, ORALLY DISINTEGRATING ORAL EVERY 8 HOURS PRN
Status: DISCONTINUED | OUTPATIENT
Start: 2023-08-24 | End: 2023-08-26 | Stop reason: HOSPADM

## 2023-08-24 RX ORDER — CLORAZEPATE DIPOTASSIUM 3.75 MG/1
7.5 TABLET ORAL EVERY 8 HOURS PRN
Status: DISCONTINUED | OUTPATIENT
Start: 2023-08-24 | End: 2023-08-26 | Stop reason: HOSPADM

## 2023-08-24 RX ORDER — DEXTROSE MONOHYDRATE AND SODIUM CHLORIDE 5; .45 G/100ML; G/100ML
INJECTION, SOLUTION INTRAVENOUS CONTINUOUS
Status: DISCONTINUED | OUTPATIENT
Start: 2023-08-24 | End: 2023-08-24

## 2023-08-24 RX ORDER — FUROSEMIDE 10 MG/ML
40 INJECTION INTRAMUSCULAR; INTRAVENOUS
Status: DISCONTINUED | OUTPATIENT
Start: 2023-08-24 | End: 2023-08-24

## 2023-08-24 RX ORDER — NITROGLYCERIN 5 MG/ML
INJECTION, SOLUTION INTRAVENOUS
Status: DISCONTINUED | OUTPATIENT
Start: 2023-08-24 | End: 2023-08-24 | Stop reason: HOSPADM

## 2023-08-24 RX ORDER — HEPARIN SODIUM 5000 [USP'U]/ML
INJECTION, SOLUTION INTRAVENOUS; SUBCUTANEOUS
Status: DISCONTINUED | OUTPATIENT
Start: 2023-08-24 | End: 2023-08-24 | Stop reason: HOSPADM

## 2023-08-24 RX ORDER — ETOMIDATE 2 MG/ML
20 INJECTION INTRAVENOUS ONCE
Status: COMPLETED | OUTPATIENT
Start: 2023-08-25 | End: 2023-08-24

## 2023-08-24 RX ORDER — ONDANSETRON 2 MG/ML
INJECTION INTRAMUSCULAR; INTRAVENOUS
Status: DISCONTINUED | OUTPATIENT
Start: 2023-08-24 | End: 2023-08-24 | Stop reason: HOSPADM

## 2023-08-24 RX ORDER — NOREPINEPHRINE BITARTRATE/D5W 4MG/250ML
0-3 PLASTIC BAG, INJECTION (ML) INTRAVENOUS CONTINUOUS
Status: DISCONTINUED | OUTPATIENT
Start: 2023-08-24 | End: 2023-08-25

## 2023-08-24 RX ORDER — FENTANYL CITRATE 50 UG/ML
INJECTION, SOLUTION INTRAMUSCULAR; INTRAVENOUS
Status: DISCONTINUED | OUTPATIENT
Start: 2023-08-24 | End: 2023-08-24 | Stop reason: HOSPADM

## 2023-08-24 RX ORDER — ROCURONIUM BROMIDE 10 MG/ML
INJECTION, SOLUTION INTRAVENOUS CODE/TRAUMA/SEDATION MEDICATION
Status: COMPLETED | OUTPATIENT
Start: 2023-08-24 | End: 2023-08-24

## 2023-08-24 RX ORDER — ACETAMINOPHEN 325 MG/1
650 TABLET ORAL EVERY 4 HOURS PRN
Status: DISCONTINUED | OUTPATIENT
Start: 2023-08-24 | End: 2023-08-26 | Stop reason: HOSPADM

## 2023-08-24 RX ORDER — FUROSEMIDE 10 MG/ML
40 INJECTION INTRAMUSCULAR; INTRAVENOUS DAILY
Status: DISCONTINUED | OUTPATIENT
Start: 2023-08-25 | End: 2023-08-25

## 2023-08-24 RX ORDER — ETOMIDATE 2 MG/ML
INJECTION INTRAVENOUS CODE/TRAUMA/SEDATION MEDICATION
Status: COMPLETED | OUTPATIENT
Start: 2023-08-24 | End: 2023-08-24

## 2023-08-24 RX ORDER — ENOXAPARIN SODIUM 150 MG/ML
1 INJECTION SUBCUTANEOUS EVERY 12 HOURS
Status: DISCONTINUED | OUTPATIENT
Start: 2023-08-24 | End: 2023-08-25

## 2023-08-24 RX ORDER — ASPIRIN 81 MG/1
81 TABLET ORAL DAILY
Status: DISCONTINUED | OUTPATIENT
Start: 2023-08-25 | End: 2023-08-26 | Stop reason: HOSPADM

## 2023-08-24 RX ORDER — MILRINONE LACTATE 0.2 MG/ML
0.25 INJECTION, SOLUTION INTRAVENOUS CONTINUOUS
Status: DISCONTINUED | OUTPATIENT
Start: 2023-08-24 | End: 2023-08-25

## 2023-08-24 RX ORDER — NOREPINEPHRINE BITARTRATE/D5W 4MG/250ML
PLASTIC BAG, INJECTION (ML) INTRAVENOUS
Status: COMPLETED
Start: 2023-08-24 | End: 2023-08-24

## 2023-08-24 RX ORDER — DIGOXIN 125 MCG
0.12 TABLET ORAL DAILY
Status: DISCONTINUED | OUTPATIENT
Start: 2023-08-25 | End: 2023-08-25

## 2023-08-24 RX ORDER — MUPIROCIN 20 MG/G
OINTMENT TOPICAL 2 TIMES DAILY
Status: DISCONTINUED | OUTPATIENT
Start: 2023-08-24 | End: 2023-08-26 | Stop reason: HOSPADM

## 2023-08-24 RX ORDER — ROCURONIUM BROMIDE 10 MG/ML
INJECTION, SOLUTION INTRAVENOUS
Status: COMPLETED
Start: 2023-08-24 | End: 2023-08-24

## 2023-08-24 RX ORDER — MORPHINE SULFATE 10 MG/ML
INJECTION INTRAMUSCULAR; INTRAVENOUS; SUBCUTANEOUS
Status: DISCONTINUED | OUTPATIENT
Start: 2023-08-24 | End: 2023-08-24 | Stop reason: HOSPADM

## 2023-08-24 RX ORDER — VERAPAMIL HYDROCHLORIDE 2.5 MG/ML
INJECTION, SOLUTION INTRAVENOUS
Status: DISCONTINUED | OUTPATIENT
Start: 2023-08-24 | End: 2023-08-24 | Stop reason: HOSPADM

## 2023-08-24 RX ORDER — ROCURONIUM BROMIDE 50 MG/5 ML
1 SYRINGE (ML) INTRAVENOUS ONCE
Status: COMPLETED | OUTPATIENT
Start: 2023-08-25 | End: 2023-08-25

## 2023-08-24 RX ORDER — ETOMIDATE 2 MG/ML
INJECTION INTRAVENOUS
Status: COMPLETED
Start: 2023-08-24 | End: 2023-08-24

## 2023-08-24 RX ORDER — MIDAZOLAM HYDROCHLORIDE 1 MG/ML
INJECTION INTRAMUSCULAR; INTRAVENOUS
Status: DISCONTINUED | OUTPATIENT
Start: 2023-08-24 | End: 2023-08-24 | Stop reason: HOSPADM

## 2023-08-24 RX ORDER — LIDOCAINE HYDROCHLORIDE 10 MG/ML
INJECTION INFILTRATION; PERINEURAL
Status: DISCONTINUED | OUTPATIENT
Start: 2023-08-24 | End: 2023-08-24 | Stop reason: HOSPADM

## 2023-08-24 RX ADMIN — CLORAZEPATE DIPOTASSIUM 7.5 MG: 3.75 TABLET ORAL at 07:08

## 2023-08-24 RX ADMIN — ETOMIDATE 20 MG: 2 INJECTION, SOLUTION INTRAVENOUS at 11:08

## 2023-08-24 RX ADMIN — VENLAFAXINE HYDROCHLORIDE 150 MG: 75 CAPSULE, EXTENDED RELEASE ORAL at 11:08

## 2023-08-24 RX ADMIN — DIPHENHYDRAMINE HYDROCHLORIDE 50 MG: 50 INJECTION, SOLUTION INTRAMUSCULAR; INTRAVENOUS at 12:08

## 2023-08-24 RX ADMIN — ROCURONIUM BROMIDE 50 MG: 10 INJECTION, SOLUTION INTRAVENOUS at 11:08

## 2023-08-24 RX ADMIN — DILTIAZEM HYDROCHLORIDE 5 MG/HR: 5 INJECTION INTRAVENOUS at 01:08

## 2023-08-24 RX ADMIN — AMIODARONE HYDROCHLORIDE 1 MG/MIN: 1.8 INJECTION, SOLUTION INTRAVENOUS at 07:08

## 2023-08-24 RX ADMIN — ROCURONIUM BROMIDE 100 MG: 10 INJECTION, SOLUTION INTRAVENOUS at 11:08

## 2023-08-24 RX ADMIN — ETOMIDATE 20 MG: 2 INJECTION INTRAVENOUS at 11:08

## 2023-08-24 RX ADMIN — METOPROLOL SUCCINATE 50 MG: 50 TABLET, EXTENDED RELEASE ORAL at 11:08

## 2023-08-24 RX ADMIN — MORPHINE SULFATE 4 MG: 4 INJECTION, SOLUTION INTRAMUSCULAR; INTRAVENOUS at 12:08

## 2023-08-24 RX ADMIN — AMIODARONE HYDROCHLORIDE 150 MG: 1.5 INJECTION, SOLUTION INTRAVENOUS at 07:08

## 2023-08-24 RX ADMIN — AMIODARONE HYDROCHLORIDE 0.5 MG/MIN: 1.8 INJECTION, SOLUTION INTRAVENOUS at 08:08

## 2023-08-24 RX ADMIN — PRAVASTATIN SODIUM 80 MG: 40 TABLET ORAL at 11:08

## 2023-08-24 RX ADMIN — NOREPINEPHRINE BITARTRATE 0.02 MCG/KG/MIN: 4 INJECTION, SOLUTION INTRAVENOUS at 11:08

## 2023-08-24 RX ADMIN — NIFEDIPINE 60 MG: 60 TABLET, FILM COATED, EXTENDED RELEASE ORAL at 11:08

## 2023-08-24 RX ADMIN — PANTOPRAZOLE SODIUM 40 MG: 40 INJECTION, POWDER, FOR SOLUTION INTRAVENOUS at 11:08

## 2023-08-24 RX ADMIN — MUPIROCIN: 20 OINTMENT TOPICAL at 11:08

## 2023-08-24 RX ADMIN — MILRINONE LACTATE IN DEXTROSE 0.25 MCG/KG/MIN: 200 INJECTION, SOLUTION INTRAVENOUS at 10:08

## 2023-08-24 NOTE — ASSESSMENT & PLAN NOTE
Patient with Long standing persistent (>12 months) atrial fibrillation which is controlled currently with Beta Blocker. Patient is currently in sinus rhythm.[id number] Score: 2. HASBLED Score: 1. Patient has not been compliant with home Eliquis. States she can't afford. Restart anticoagulation in case no need of procedure or surgical intervention.

## 2023-08-24 NOTE — ASSESSMENT & PLAN NOTE
Clinical picture not consistent with pneumonia at the moment  Chest Xray showing pneumonitis  Monitor patient and in case fever or elevated WBC start IV antibiotics
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
## 2023-08-24 NOTE — ASSESSMENT & PLAN NOTE
Patient has hyponatremia which is uncontrolled,We will aim to correct the sodium by 4-6mEq in 24 hours. We will monitor sodium Daily. The hyponatremia is due to Dehydration/hypovolemia. We will treat the hyponatremia with IV fluids. The patient's sodium results have been reviewed and are listed below.  Recent Labs   Lab 08/23/23  1208   *

## 2023-08-24 NOTE — ASSESSMENT & PLAN NOTE
Bilious vomiting for the last 4 days. Unable to keep anything even medications down.  KUB showing no obvious obstructive  DDx include gastric outlet obstruction  Start IV Reglan PRN and IVF  Monitor electrolytes and fluid status  Consult GI

## 2023-08-24 NOTE — PLAN OF CARE
[facility]  Initial Discharge Assessment       Primary Care Provider: Asad Mendez IV, DO    Admission Diagnosis: Atrial fibrillation with rapid ventricular response [I48.91]    Admission Date: 8/23/2023  Expected Discharge Date:     Transition of Care Barriers: None    Payor: MEDICARE / Plan: MEDICARE PART A & B / Product Type: Government /     Extended Emergency Contact Information  Primary Emergency Contact: Kaley Rainey   [facility]  Home Phone: [phone]  Relation: Daughter  Secondary Emergency Contact: Aida Ferro  Mobile Phone: [phone]  Relation: Relative   needed? No    Discharge Plan A: Home with family  Discharge Plan B: Home with family      [facility] [place], [place] - [address]  [address]  [place] [address]  Phone: [phone] Fax: [phone]    [facility] - [place], [place] - [address]  [address]  [place] [address]  Phone: [phone] Fax: [phone]    [id number] - [place], [place] - [address] AT [facility]  [address]  [place] [address]  Phone: [phone] Fax: [phone]    [id number] - [place] - [address]  [address] [place] [address]  Phone: [phone] Fax: [phone]    [id number] - [place] - [address] [facility]  [address]  [place] [address]  Phone: [phone] Fax: [phone]    [facility] [address] [place] [address]  Phone: [phone] Fax: [phone]      Initial Assessment (most recent)       Adult Discharge Assessment - 08/24/23 1342          Discharge Assessment    Assessment Type Discharge Planning Assessment     Source of Information patient     People in Home other relative(s)   niece    Do you expect to return to your current living situation? Yes     Do you have help at home or someone to help you  manage your care at home? Yes     Who are your caregiver(s) and their phone number(s)? kathy jiménez     Prior to hospitilization cognitive status: Alert/Oriented     Current cognitive status: Alert/Oriented     Home Accessibility stairs to enter home;stairs within home     Number of Stairs, Main Entrance four     Home Layout Able to live on 1st floor     Equipment Currently Used at Home oxygen;walker, rolling     Do you currently have service(s) that help you manage your care at home? No     Do you take prescription medications? Yes     Do you have prescription coverage? Yes     Do you have any problems affording any of your prescribed medications? No     Who is going to help you get home at discharge? niece     How do you get to doctors appointments? family or friend will provide     Are you on dialysis? No     Do you take coumadin? No     DME Needed Upon Discharge  none     Discharge Plan discussed with: Patient     Transition of Care Barriers None     Discharge Plan A Home with family     Discharge Plan B Home with family        Physical Activity    On average, how many days per week do you engage in moderate to strenuous exercise (like a brisk walk)? 0 days     On average, how many minutes do you engage in exercise at this level? 0 min        Financial Resource Strain    How hard is it for you to pay for the very basics like food, housing, medical care, and heating? Not hard at all        Housing Stability    In the last 12 months, was there a time when you were not able to pay the mortgage or rent on time? No     In the last 12 months, how many places have you lived? 2     In the last 12 months, was there a time when you did not have a steady place to sleep or slept in a shelter (including now)? No        Transportation Needs    In the past 12 months, has lack of transportation kept you from medical appointments or from getting medications? No     In the past 12 months, has lack of transportation kept you from  meetings, work, or from getting things needed for daily living? No        Food Insecurity    Within the past 12 months, you worried that your food would run out before you got the money to buy more. Never true     Within the past 12 months, the food you bought just didn't last and you didn't have money to get more. Never true        Stress    Do you feel stress - tense, restless, nervous, or anxious, or unable to sleep at night because your mind is troubled all the time - these days? Not at all        Social Connections    In a typical week, how many times do you talk on the phone with family, friends, or neighbors? More than three times a week     How often do you get together with friends or relatives? More than three times a week     How often do you attend Christianity or Orthodox services? Never     Do you belong to any clubs or organizations such as Christianity groups, unions, fraternal or athletic groups, or school groups? No     How often do you attend meetings of the clubs or organizations you belong to? Never     Are you , , , , never , or living with a partner?         Alcohol Use    Q1: How often do you have a drink containing alcohol? Never     Q2: How many drinks containing alcohol do you have on a typical day when you are drinking? Patient does not drink     Q3: How often do you have six or more drinks on one occasion? Never                   Pt lives at home and her niece lives with her, no hh pta, has home o2 from [place] but wants a new tank from [place], ss will notify Dr. Garcia, [facility], dc plan home, following for needs

## 2023-08-24 NOTE — ASSESSMENT & PLAN NOTE
Bilious vomiting for the last 4 days. Unable to keep anything even medications down.  KUB showing non obstructive pattern  DDx include gastric outlet obstruction  Start IV Reglan PRN and IVF  Monitor electrolytes and fluid status  Consult GI

## 2023-08-24 NOTE — HPI
Patient is a 71 y/o female who presents to the [facility] transferred from [facility] ED with complaint of vomiting for 4 days. She states the vomiting started out of no where and has been occurring around every 2 hours. She is not able to keep anything down including her meds. Motion makes her symptoms worse. Sometimes turning off all of the light helps the nausea go away. Patient also reports fever that occurred the first day of her illness, abdominal pain that she attributes to repetitive vomiting, weakness in her legs, dizziness, shortness of breath, wheezing leg swelling, periodic palpations, and easy bruising and heartburn that occurs every 2 weeks. She also reports history of melena with the last occurrence a year ago. Patient denies hematemesis, urinary problems,vision changes, chest pain. She also denies any sick contacts. Patient with a significant PMH of MAKENNA, Atrial fibrillation, hypertension, hyperlipidemia,CAD, gastritis, kidney stones, and asthma.    Prior hospitalizations:  8/10/23: Complete R rotator cuff tear  10/08/2021: symptomatic anemia  4/13/21: anemia  3/06/21: anemia  10/08/2018:  Bilateral carotid stent placement        Prior studies:  8/10/23- EKG:Atrial fibrillation with rapid ventricular response   Septal infarct ,age undetermined     10/08/21:-CT CHEST WITHOUT CONTRAST- . Small left pleural effusion with bibasilar dependent atelectasis.  2. Small bilateral pulmonary nodules.  Follow-up per Fleischner guidelines.  For multiple solid nodules all <6 mm, Fleischner Society 2017 guidelines recommend no routine follow up for a low risk patient, or follow up with non-contrast chest CT at 12 months after discovery in a high risk patient.  3. Granulomatous change.     3/8/21: EGD: - Normal esophagus.                         - Gastritis.                         - Normal examined duodenum.                         - No specimens collected.      4/13/21: Colonoscopy: Diverticulosis in the sigmoid  colon and in the                          descending colon.                          - Non-bleeding internal hemorrhoids.                          - The examination was otherwise normal.                          - No specimens collected.      10/08/21:CT ABDOMEN PELVIS WITHOUT CONTRAST- 1. Small left pleural effusion with dependent discoid atelectasis the lung bases.  2. Suspected bilateral adrenal hyperplasia.  3. Findings consistent with acute diverticulitis of the distal descending colon.  Correlate clinically with possible fever and/or elevated white count.  This should be followed until clear to exclude underlying suspicious colonic lesion.  4. Prior hysterectomy.     3/7/21 Echo: EF 60%

## 2023-08-24 NOTE — ASSESSMENT & PLAN NOTE
Patient with acute kidney injury/acute renal failure likely due to pre-renal azotemia due to dehydration MAKENNA is currently worsening. Baseline creatinine 1.3 one year ago - Labs reviewed- Renal function/electrolytes with Estimated Creatinine Clearance: 30.6 mL/min (A) (based on SCr of 1.97 mg/dL (H)). according to latest data. Monitor urine output and serial BMP and adjust therapy as needed. Avoid nephrotoxins and renally dose meds for GFR listed above. IVF started.

## 2023-08-24 NOTE — ASSESSMENT & PLAN NOTE
Clinical picture not consistent with pneumonia at the moment  Chest Xray showing pneumonitis  Monitor patient and in case fever or elevated WBC start antibiotics
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
## 2023-08-25 PROBLEM — R57.0 CARDIOGENIC SHOCK: Status: ACTIVE | Noted: 2023-08-25

## 2023-08-25 PROBLEM — R65.20 SEVERE SEPSIS: Status: ACTIVE | Noted: 2023-08-25

## 2023-08-25 PROBLEM — A41.9 SEVERE SEPSIS: Status: ACTIVE | Noted: 2023-08-25

## 2023-08-25 PROBLEM — E44.1 MALNUTRITION OF MILD DEGREE: Status: ACTIVE | Noted: 2023-08-25

## 2023-08-25 PROBLEM — K72.00 SHOCK LIVER: Status: ACTIVE | Noted: 2023-08-25

## 2023-08-25 PROBLEM — J96.01 ACUTE RESPIRATORY FAILURE WITH HYPOXIA: Status: ACTIVE | Noted: 2023-08-25

## 2023-08-25 PROBLEM — G93.41 ENCEPHALOPATHY, METABOLIC: Status: ACTIVE | Noted: 2023-08-25

## 2023-08-25 PROBLEM — D68.9 COAGULOPATHY: Status: ACTIVE | Noted: 2023-08-25

## 2023-08-25 PROBLEM — R79.89 ELEVATED TROPONIN: Status: RESOLVED | Noted: 2023-08-24 | Resolved: 2023-08-25

## 2023-08-25 LAB
ALBUMIN SERPL BCP-MCNC: 2.6 G/DL (ref 3.5–5)
ALBUMIN SERPL BCP-MCNC: 2.6 G/DL (ref 3.5–5)
ALBUMIN SERPL BCP-MCNC: 2.7 G/DL (ref 3.5–5)
ALBUMIN/GLOB SERPL: 0.8 {RATIO}
ALBUMIN/GLOB SERPL: 0.9 {RATIO}
ALP SERPL-CCNC: 123 U/L (ref 55–142)
ALP SERPL-CCNC: 127 U/L (ref 55–142)
ALP SERPL-CCNC: 128 U/L (ref 55–142)
ALT SERPL W P-5'-P-CCNC: 3302 U/L (ref 13–56)
ALT SERPL W P-5'-P-CCNC: 4233 U/L (ref 13–56)
ALT SERPL W P-5'-P-CCNC: 4405 U/L (ref 13–56)
AMORPHOUS CRYSTALS, UA (OHS): ABNORMAL /HPF
ANION GAP SERPL CALCULATED.3IONS-SCNC: 21 MMOL/L (ref 7–16)
ANION GAP SERPL CALCULATED.3IONS-SCNC: 26 MMOL/L (ref 7–16)
ANION GAP SERPL CALCULATED.3IONS-SCNC: 28 MMOL/L (ref 7–16)
ANISOCYTOSIS BLD QL SMEAR: ABNORMAL
APTT PPP: 49.8 SECONDS (ref 25.2–37.3)
AST SERPL W P-5'-P-CCNC: 8737 U/L (ref 15–37)
AST SERPL W P-5'-P-CCNC: ABNORMAL U/L (ref 15–37)
AST SERPL W P-5'-P-CCNC: ABNORMAL U/L (ref 15–37)
BACTERIA #/AREA URNS HPF: ABNORMAL /HPF
BASOPHILS # BLD AUTO: 0.2 K/UL (ref 0–0.2)
BASOPHILS NFR BLD AUTO: 0.5 % (ref 0–1)
BILIRUB DIRECT SERPL-MCNC: 3 MG/DL (ref 0–0.2)
BILIRUB SERPL-MCNC: 3.5 MG/DL (ref ?–1.2)
BILIRUB SERPL-MCNC: 3.9 MG/DL (ref ?–1.2)
BILIRUB SERPL-MCNC: 4.5 MG/DL (ref ?–1.2)
BILIRUB UR QL STRIP: NEGATIVE
BUN SERPL-MCNC: 38 MG/DL (ref 7–18)
BUN SERPL-MCNC: 38 MG/DL (ref 7–18)
BUN SERPL-MCNC: 49 MG/DL (ref 7–18)
BUN/CREAT SERPL: 13 (ref 6–20)
BUN/CREAT SERPL: 13 (ref 6–20)
BUN/CREAT SERPL: 14 (ref 6–20)
CALCIUM SERPL-MCNC: 6.3 MG/DL (ref 8.5–10.1)
CALCIUM SERPL-MCNC: 7.3 MG/DL (ref 8.5–10.1)
CALCIUM SERPL-MCNC: 7.3 MG/DL (ref 8.5–10.1)
CHLORIDE SERPL-SCNC: 85 MMOL/L (ref 98–107)
CHLORIDE SERPL-SCNC: 87 MMOL/L (ref 98–107)
CHLORIDE SERPL-SCNC: 89 MMOL/L (ref 98–107)
CLARITY UR: ABNORMAL
CO2 SERPL-SCNC: 16 MMOL/L (ref 21–32)
CO2 SERPL-SCNC: 17 MMOL/L (ref 21–32)
CO2 SERPL-SCNC: 24 MMOL/L (ref 21–32)
COLOR UR: ABNORMAL
CREAT SERPL-MCNC: 2.86 MG/DL (ref 0.55–1.02)
CREAT SERPL-MCNC: 2.91 MG/DL (ref 0.55–1.02)
CREAT SERPL-MCNC: 3.58 MG/DL (ref 0.55–1.02)
DIFFERENTIAL METHOD BLD: ABNORMAL
EGFR (NO RACE VARIABLE) (RUSH/TITUS): 13 ML/MIN/1.73M2
EGFR (NO RACE VARIABLE) (RUSH/TITUS): 16 ML/MIN/1.73M2
EGFR (NO RACE VARIABLE) (RUSH/TITUS): 17 ML/MIN/1.73M2
EOSINOPHIL # BLD AUTO: 0 K/UL (ref 0–0.5)
EOSINOPHIL NFR BLD AUTO: 0 % (ref 1–4)
ERYTHROCYTE [DISTWIDTH] IN BLOOD BY AUTOMATED COUNT: 17 % (ref 11.5–14.5)
GLOBULIN SER-MCNC: 2.8 G/DL (ref 2–4)
GLOBULIN SER-MCNC: 3.2 G/DL (ref 2–4)
GLUCOSE SERPL-MCNC: 146 MG/DL (ref 74–106)
GLUCOSE SERPL-MCNC: 172 MG/DL (ref 74–106)
GLUCOSE SERPL-MCNC: 245 MG/DL (ref 74–106)
GLUCOSE UR STRIP-MCNC: 70 MG/DL
HCO3 UR-SCNC: 14.8 MMOL/L (ref 21–28)
HCO3 UR-SCNC: 21.1 MMOL/L (ref 21–28)
HCT VFR BLD AUTO: 40 % (ref 38–47)
HGB BLD-MCNC: 12.2 G/DL (ref 12–16)
IMM GRANULOCYTES # BLD AUTO: 1.64 K/UL (ref 0–0.04)
IMM GRANULOCYTES NFR BLD: 4.2 % (ref 0–0.4)
INDIRECT COOMBS: NORMAL
INR BLD: 3.43
KETONES UR STRIP-SCNC: NEGATIVE MG/DL
LACTATE SERPL-SCNC: 10.9 MMOL/L (ref 0.4–2)
LACTATE SERPL-SCNC: 11.6 MMOL/L (ref 0.4–2)
LACTATE SERPL-SCNC: 7.7 MMOL/L (ref 0.4–2)
LACTATE SERPL-SCNC: 8 MMOL/L (ref 0.4–2)
LACTATE SERPL-SCNC: 8.2 MMOL/L (ref 0.4–2)
LACTATE SERPL-SCNC: 8.4 MMOL/L (ref 0.4–2)
LACTATE SERPL-SCNC: 8.6 MMOL/L (ref 0.4–2)
LACTATE SERPL-SCNC: 8.7 MMOL/L (ref 0.4–2)
LACTATE SERPL-SCNC: 9.6 MMOL/L (ref 0.4–2)
LACTATE SERPL-SCNC: 9.7 MMOL/L (ref 0.4–2)
LDH SERPL-CCNC: ABNORMAL U/L (ref 87–241)
LEUKOCYTE ESTERASE UR QL STRIP: NEGATIVE
LYMPHOCYTES # BLD AUTO: 1.16 K/UL (ref 1–4.8)
LYMPHOCYTES NFR BLD AUTO: 3 % (ref 27–41)
LYMPHOCYTES NFR BLD MANUAL: 2 % (ref 27–41)
MAGNESIUM SERPL-MCNC: 2.6 MG/DL (ref 1.7–2.3)
MCH RBC QN AUTO: 25.7 PG (ref 27–31)
MCHC RBC AUTO-ENTMCNC: 30.5 G/DL (ref 32–36)
MCV RBC AUTO: 84.4 FL (ref 80–96)
MONOCYTES # BLD AUTO: 2.43 K/UL (ref 0–0.8)
MONOCYTES NFR BLD AUTO: 6.2 % (ref 2–6)
MONOCYTES NFR BLD MANUAL: 6 % (ref 2–6)
MPC BLD CALC-MCNC: 10.8 FL (ref 9.4–12.4)
MUCOUS, UA: ABNORMAL /LPF
NEUTROPHILS # BLD AUTO: 33.68 K/UL (ref 1.8–7.7)
NEUTROPHILS NFR BLD AUTO: 86.1 % (ref 53–65)
NEUTS BAND NFR BLD MANUAL: 6 % (ref 1–5)
NEUTS SEG NFR BLD MANUAL: 86 % (ref 50–62)
NITRITE UR QL STRIP: NEGATIVE
NRBC # BLD AUTO: 0.17 X10E3/UL
NRBC, AUTO (.00): 0.4 %
NT-PROBNP SERPL-MCNC: ABNORMAL PG/ML (ref 1–450)
OVALOCYTES BLD QL SMEAR: ABNORMAL
PCO2 BLDA: 47 MMHG (ref 35–48)
PCO2 BLDA: 56 MMHG (ref 35–48)
PH SMN: 7.03 [PH] (ref 7.35–7.45)
PH SMN: 7.26 [PH] (ref 7.35–7.45)
PH UR STRIP: 5.5 PH UNITS
PLATELET # BLD AUTO: 215 K/UL (ref 150–400)
PLATELET MORPHOLOGY: ABNORMAL
PO2 BLDA: 62 MMHG (ref 83–108)
PO2 BLDA: 84 MMHG (ref 83–108)
POC BASE EXCESS: -16 MMOL/L (ref -2–3)
POC BASE EXCESS: -6 MMOL/L (ref -2–3)
POC SATURATED O2: 77 % (ref 95–98)
POC SATURATED O2: 95 % (ref 95–98)
POLYCHROMASIA BLD QL SMEAR: ABNORMAL
POTASSIUM SERPL-SCNC: 2.9 MMOL/L (ref 3.5–5.1)
POTASSIUM SERPL-SCNC: 4.4 MMOL/L (ref 3.5–5.1)
POTASSIUM SERPL-SCNC: 4.5 MMOL/L (ref 3.5–5.1)
PROT SERPL-MCNC: 5.4 G/DL (ref 6.4–8.2)
PROT SERPL-MCNC: 5.5 G/DL (ref 6.4–8.2)
PROT SERPL-MCNC: 5.8 G/DL (ref 6.4–8.2)
PROT UR QL STRIP: 200
PROTHROMBIN TIME: 33.8 SECONDS (ref 11.7–14.7)
RBC # BLD AUTO: 4.74 M/UL (ref 4.2–5.4)
RBC # UR STRIP: ABNORMAL /UL
RBC #/AREA URNS HPF: >182 /HPF
RH BLD: NORMAL
SODIUM SERPL-SCNC: 126 MMOL/L (ref 136–145)
SODIUM SERPL-SCNC: 127 MMOL/L (ref 136–145)
SODIUM SERPL-SCNC: 128 MMOL/L (ref 136–145)
SP GR UR STRIP: 1.04
SPECIMEN OUTDATE: NORMAL
SQUAMOUS #/AREA URNS LPF: ABNORMAL /HPF
TRANS CELLS #/AREA URNS LPF: ABNORMAL /LPF
TROPONIN I SERPL DL<=0.01 NG/ML-MCNC: 47.1 PG/ML
UROBILINOGEN UR STRIP-ACNC: 2 MG/DL
WBC # BLD AUTO: 39.11 K/UL (ref 4.5–11)
WBC #/AREA URNS HPF: 56 /HPF
WBC CLUMPS, UA: ABNORMAL /HPF

## 2023-08-25 PROCEDURE — 33990 INSJ PERQ VAD L HRT ARTERIAL: CPT | Mod: ,,, | Performed by: INTERNAL MEDICINE

## 2023-08-25 PROCEDURE — 36556 PR INSERT NON-TUNNEL CV CATH 5+ YRS OLD: ICD-10-PCS | Mod: ,,, | Performed by: NURSE PRACTITIONER

## 2023-08-25 PROCEDURE — 93503 PR INSERT/PLACE FLOW DIRECT CATH: ICD-10-PCS | Mod: ,,, | Performed by: INTERNAL MEDICINE

## 2023-08-25 PROCEDURE — 25000003 PHARM REV CODE 250: Performed by: GENERAL PRACTICE

## 2023-08-25 PROCEDURE — C1751 CATH, INF, PER/CENT/MIDLINE: HCPCS | Performed by: INTERNAL MEDICINE

## 2023-08-25 PROCEDURE — 94761 N-INVAS EAR/PLS OXIMETRY MLT: CPT

## 2023-08-25 PROCEDURE — 25000003 PHARM REV CODE 250: Performed by: STUDENT IN AN ORGANIZED HEALTH CARE EDUCATION/TRAINING PROGRAM

## 2023-08-25 PROCEDURE — 85730 THROMBOPLASTIN TIME PARTIAL: CPT | Performed by: HOSPITALIST

## 2023-08-25 PROCEDURE — 85610 PROTHROMBIN TIME: CPT | Performed by: HOSPITALIST

## 2023-08-25 PROCEDURE — 83880 ASSAY OF NATRIURETIC PEPTIDE: CPT | Performed by: HOSPITALIST

## 2023-08-25 PROCEDURE — 82533 TOTAL CORTISOL: CPT | Performed by: INTERNAL MEDICINE

## 2023-08-25 PROCEDURE — 25000003 PHARM REV CODE 250: Performed by: INTERNAL MEDICINE

## 2023-08-25 PROCEDURE — 87040 BLOOD CULTURE FOR BACTERIA: CPT | Performed by: HOSPITALIST

## 2023-08-25 PROCEDURE — 83615 LACTATE (LD) (LDH) ENZYME: CPT | Performed by: INTERNAL MEDICINE

## 2023-08-25 PROCEDURE — 27000221 HC OXYGEN, UP TO 24 HOURS

## 2023-08-25 PROCEDURE — 63600175 PHARM REV CODE 636 W HCPCS: Performed by: STUDENT IN AN ORGANIZED HEALTH CARE EDUCATION/TRAINING PROGRAM

## 2023-08-25 PROCEDURE — 25000003 PHARM REV CODE 250: Performed by: HOSPITALIST

## 2023-08-25 PROCEDURE — 83735 ASSAY OF MAGNESIUM: CPT | Performed by: HOSPITALIST

## 2023-08-25 PROCEDURE — 99223 1ST HOSP IP/OBS HIGH 75: CPT | Mod: ,,, | Performed by: INTERNAL MEDICINE

## 2023-08-25 PROCEDURE — 63600175 PHARM REV CODE 636 W HCPCS: Performed by: NURSE PRACTITIONER

## 2023-08-25 PROCEDURE — 83605 ASSAY OF LACTIC ACID: CPT | Performed by: STUDENT IN AN ORGANIZED HEALTH CARE EDUCATION/TRAINING PROGRAM

## 2023-08-25 PROCEDURE — 27801859 OPTIME CATHETER, IMPELLA: Performed by: INTERNAL MEDICINE

## 2023-08-25 PROCEDURE — 80076 HEPATIC FUNCTION PANEL: CPT | Performed by: STUDENT IN AN ORGANIZED HEALTH CARE EDUCATION/TRAINING PROGRAM

## 2023-08-25 PROCEDURE — C9113 INJ PANTOPRAZOLE SODIUM, VIA: HCPCS | Performed by: HOSPITALIST

## 2023-08-25 PROCEDURE — 99152 MOD SED SAME PHYS/QHP 5/>YRS: CPT | Mod: ,,, | Performed by: INTERNAL MEDICINE

## 2023-08-25 PROCEDURE — 99152 MOD SED SAME PHYS/QHP 5/>YRS: CPT | Performed by: INTERNAL MEDICINE

## 2023-08-25 PROCEDURE — 27201423 OPTIME MED/SURG SUP & DEVICES STERILE SUPPLY: Performed by: INTERNAL MEDICINE

## 2023-08-25 PROCEDURE — 99233 PR SUBSEQUENT HOSPITAL CARE,LEVL III: ICD-10-PCS | Mod: ,,, | Performed by: NURSE PRACTITIONER

## 2023-08-25 PROCEDURE — 99152 PR MOD CONSCIOUS SEDATION, SAME PHYS, 5+ YRS, FIRST 15 MIN: ICD-10-PCS | Mod: ,,, | Performed by: INTERNAL MEDICINE

## 2023-08-25 PROCEDURE — 36556 INSERT NON-TUNNEL CV CATH: CPT | Mod: ,,, | Performed by: NURSE PRACTITIONER

## 2023-08-25 PROCEDURE — 99223 PR INITIAL HOSPITAL CARE,LEVL III: ICD-10-PCS | Mod: ,,, | Performed by: INTERNAL MEDICINE

## 2023-08-25 PROCEDURE — 25000003 PHARM REV CODE 250: Performed by: NURSE PRACTITIONER

## 2023-08-25 PROCEDURE — 80053 COMPREHEN METABOLIC PANEL: CPT | Performed by: INTERNAL MEDICINE

## 2023-08-25 PROCEDURE — 99233 SBSQ HOSP IP/OBS HIGH 50: CPT | Mod: ,,, | Performed by: NURSE PRACTITIONER

## 2023-08-25 PROCEDURE — 99153 MOD SED SAME PHYS/QHP EA: CPT | Performed by: INTERNAL MEDICINE

## 2023-08-25 PROCEDURE — 27200966 HC CLOSED SUCTION SYSTEM

## 2023-08-25 PROCEDURE — 33990 INSJ PERQ VAD L HRT ARTERIAL: CPT | Performed by: INTERNAL MEDICINE

## 2023-08-25 PROCEDURE — 99291 PR CRITICAL CARE, E/M 30-74 MINUTES: ICD-10-PCS | Mod: ,,, | Performed by: STUDENT IN AN ORGANIZED HEALTH CARE EDUCATION/TRAINING PROGRAM

## 2023-08-25 PROCEDURE — 85025 COMPLETE CBC W/AUTO DIFF WBC: CPT | Performed by: GENERAL PRACTICE

## 2023-08-25 PROCEDURE — 80053 COMPREHEN METABOLIC PANEL: CPT | Performed by: HOSPITALIST

## 2023-08-25 PROCEDURE — 99900035 HC TECH TIME PER 15 MIN (STAT)

## 2023-08-25 PROCEDURE — 93503 INSERT/PLACE HEART CATHETER: CPT | Performed by: INTERNAL MEDICINE

## 2023-08-25 PROCEDURE — 99900026 HC AIRWAY MAINTENANCE (STAT)

## 2023-08-25 PROCEDURE — 80048 BASIC METABOLIC PNL TOTAL CA: CPT | Mod: XB | Performed by: GENERAL PRACTICE

## 2023-08-25 PROCEDURE — 86850 RBC ANTIBODY SCREEN: CPT

## 2023-08-25 PROCEDURE — 63600175 PHARM REV CODE 636 W HCPCS: Performed by: HOSPITALIST

## 2023-08-25 PROCEDURE — 36600 WITHDRAWAL OF ARTERIAL BLOOD: CPT

## 2023-08-25 PROCEDURE — 20000000 HC ICU ROOM

## 2023-08-25 PROCEDURE — 33990 PR INSERT, VAD, PERCUT, LT HEART, ART ACCESS ONLY: ICD-10-PCS | Mod: ,,, | Performed by: INTERNAL MEDICINE

## 2023-08-25 PROCEDURE — 84484 ASSAY OF TROPONIN QUANT: CPT | Performed by: HOSPITALIST

## 2023-08-25 PROCEDURE — 25000003 PHARM REV CODE 250

## 2023-08-25 PROCEDURE — 81001 URINALYSIS AUTO W/SCOPE: CPT | Performed by: NURSE PRACTITIONER

## 2023-08-25 PROCEDURE — C1894 INTRO/SHEATH, NON-LASER: HCPCS | Performed by: INTERNAL MEDICINE

## 2023-08-25 PROCEDURE — 82803 BLOOD GASES ANY COMBINATION: CPT

## 2023-08-25 PROCEDURE — 63600175 PHARM REV CODE 636 W HCPCS: Performed by: INTERNAL MEDICINE

## 2023-08-25 PROCEDURE — 99291 CRITICAL CARE FIRST HOUR: CPT | Mod: ,,, | Performed by: STUDENT IN AN ORGANIZED HEALTH CARE EDUCATION/TRAINING PROGRAM

## 2023-08-25 PROCEDURE — 94003 VENT MGMT INPAT SUBQ DAY: CPT

## 2023-08-25 PROCEDURE — 93503 INSERT/PLACE HEART CATHETER: CPT | Mod: ,,, | Performed by: INTERNAL MEDICINE

## 2023-08-25 PROCEDURE — 87086 URINE CULTURE/COLONY COUNT: CPT | Performed by: NURSE PRACTITIONER

## 2023-08-25 PROCEDURE — 25500020 PHARM REV CODE 255: Performed by: INTERNAL MEDICINE

## 2023-08-25 RX ORDER — CALCIUM GLUCONATE 20 MG/ML
1 INJECTION, SOLUTION INTRAVENOUS ONCE
Status: COMPLETED | OUTPATIENT
Start: 2023-08-25 | End: 2023-08-25

## 2023-08-25 RX ORDER — LINEZOLID 2 MG/ML
600 INJECTION, SOLUTION INTRAVENOUS
Status: DISCONTINUED | OUTPATIENT
Start: 2023-08-25 | End: 2023-08-26 | Stop reason: HOSPADM

## 2023-08-25 RX ORDER — FENTANYL CITRAT/DEXTROSE 5%/PF 100 MCG/10
0-200 PATIENT CONTROLLED ANALGESIA SYRINGE INTRAVENOUS CONTINUOUS
Status: DISCONTINUED | OUTPATIENT
Start: 2023-08-25 | End: 2023-08-26 | Stop reason: HOSPADM

## 2023-08-25 RX ORDER — METHYLPREDNISOLONE SOD SUCC 125 MG
60 VIAL (EA) INJECTION EVERY 6 HOURS
Status: DISCONTINUED | OUTPATIENT
Start: 2023-08-25 | End: 2023-08-26 | Stop reason: HOSPADM

## 2023-08-25 RX ORDER — LIDOCAINE HYDROCHLORIDE 10 MG/ML
INJECTION INFILTRATION; PERINEURAL
Status: DISCONTINUED | OUTPATIENT
Start: 2023-08-25 | End: 2023-08-25 | Stop reason: HOSPADM

## 2023-08-25 RX ORDER — POTASSIUM CHLORIDE 7.45 MG/ML
10 INJECTION INTRAVENOUS ONCE
Status: COMPLETED | OUTPATIENT
Start: 2023-08-25 | End: 2023-08-25

## 2023-08-25 RX ORDER — FENTANYL CITRAT/DEXTROSE 5%/PF 100 MCG/10
0-200 PATIENT CONTROLLED ANALGESIA SYRINGE INTRAVENOUS CONTINUOUS
Status: DISCONTINUED | OUTPATIENT
Start: 2023-08-25 | End: 2023-08-25

## 2023-08-25 RX ORDER — PROPOFOL 10 MG/ML
0-50 INJECTION, EMULSION INTRAVENOUS CONTINUOUS
Status: DISCONTINUED | OUTPATIENT
Start: 2023-08-25 | End: 2023-08-26 | Stop reason: HOSPADM

## 2023-08-25 RX ORDER — DOBUTAMINE HYDROCHLORIDE 400 MG/100ML
2.5 INJECTION INTRAVENOUS CONTINUOUS
Status: DISCONTINUED | OUTPATIENT
Start: 2023-08-25 | End: 2023-08-26 | Stop reason: HOSPADM

## 2023-08-25 RX ORDER — MIDAZOLAM HYDROCHLORIDE 1 MG/ML
INJECTION INTRAMUSCULAR; INTRAVENOUS
Status: COMPLETED
Start: 2023-08-25 | End: 2023-08-25

## 2023-08-25 RX ORDER — INDOMETHACIN 25 MG/1
50 CAPSULE ORAL ONCE
Status: COMPLETED | OUTPATIENT
Start: 2023-08-25 | End: 2023-08-25

## 2023-08-25 RX ORDER — SODIUM CHLORIDE 9 MG/ML
INJECTION, SOLUTION INTRAVENOUS
Status: COMPLETED
Start: 2023-08-25 | End: 2023-08-25

## 2023-08-25 RX ORDER — SODIUM BICARBONATE 1 MEQ/ML
50 SYRINGE (ML) INTRAVENOUS ONCE
Status: DISCONTINUED | OUTPATIENT
Start: 2023-08-25 | End: 2023-08-25

## 2023-08-25 RX ORDER — SODIUM CHLORIDE 9 MG/ML
INJECTION, SOLUTION INTRAVENOUS CONTINUOUS
Status: DISPENSED | OUTPATIENT
Start: 2023-08-25 | End: 2023-08-26

## 2023-08-25 RX ORDER — PROPOFOL 10 MG/ML
INJECTION, EMULSION INTRAVENOUS
Status: COMPLETED
Start: 2023-08-25 | End: 2023-08-25

## 2023-08-25 RX ORDER — HEPARIN SODIUM 1000 [USP'U]/ML
INJECTION, SOLUTION INTRAVENOUS; SUBCUTANEOUS
Status: DISCONTINUED | OUTPATIENT
Start: 2023-08-25 | End: 2023-08-25 | Stop reason: HOSPADM

## 2023-08-25 RX ORDER — MIDAZOLAM HYDROCHLORIDE 1 MG/ML
2 INJECTION INTRAMUSCULAR; INTRAVENOUS ONCE
Status: COMPLETED | OUTPATIENT
Start: 2023-08-25 | End: 2023-08-25

## 2023-08-25 RX ORDER — SODIUM CHLORIDE 9 MG/ML
INJECTION, SOLUTION INTRAVENOUS CONTINUOUS
Status: DISCONTINUED | OUTPATIENT
Start: 2023-08-25 | End: 2023-08-25

## 2023-08-25 RX ORDER — ENOXAPARIN SODIUM 150 MG/ML
1 INJECTION SUBCUTANEOUS EVERY 24 HOURS
Status: DISCONTINUED | OUTPATIENT
Start: 2023-08-26 | End: 2023-08-26 | Stop reason: HOSPADM

## 2023-08-25 RX ADMIN — POTASSIUM CHLORIDE 10 MEQ: 7.46 INJECTION, SOLUTION INTRAVENOUS at 08:08

## 2023-08-25 RX ADMIN — ENOXAPARIN SODIUM 105 MG: 150 INJECTION SUBCUTANEOUS at 08:08

## 2023-08-25 RX ADMIN — ENOXAPARIN SODIUM 105 MG: 150 INJECTION SUBCUTANEOUS at 12:08

## 2023-08-25 RX ADMIN — METHYLPREDNISOLONE SODIUM SUCCINATE 60 MG: 125 INJECTION INTRAMUSCULAR; INTRAVENOUS at 05:08

## 2023-08-25 RX ADMIN — SODIUM CHLORIDE: 9 INJECTION, SOLUTION INTRAVENOUS at 12:08

## 2023-08-25 RX ADMIN — PIPERACILLIN AND TAZOBACTAM 4.5 G: 4; .5 INJECTION, POWDER, FOR SOLUTION INTRAVENOUS; PARENTERAL at 01:08

## 2023-08-25 RX ADMIN — MILRINONE LACTATE IN DEXTROSE 0.25 MCG/KG/MIN: 200 INJECTION, SOLUTION INTRAVENOUS at 08:08

## 2023-08-25 RX ADMIN — DOBUTAMINE HYDROCHLORIDE 5 MCG/KG/MIN: 400 INJECTION INTRAVENOUS at 11:08

## 2023-08-25 RX ADMIN — PIPERACILLIN AND TAZOBACTAM 4.5 G: 4; .5 INJECTION, POWDER, FOR SOLUTION INTRAVENOUS; PARENTERAL at 10:08

## 2023-08-25 RX ADMIN — LINEZOLID 600 MG: 600 INJECTION, SOLUTION INTRAVENOUS at 11:08

## 2023-08-25 RX ADMIN — SODIUM CHLORIDE: 9 INJECTION, SOLUTION INTRAVENOUS at 11:08

## 2023-08-25 RX ADMIN — PANTOPRAZOLE SODIUM 40 MG: 40 INJECTION, POWDER, FOR SOLUTION INTRAVENOUS at 08:08

## 2023-08-25 RX ADMIN — NOREPINEPHRINE BITARTRATE 0.14 MCG/KG/MIN: 4 INJECTION, SOLUTION INTRAVENOUS at 09:08

## 2023-08-25 RX ADMIN — MIDAZOLAM 2 MG: 1 INJECTION INTRAMUSCULAR; INTRAVENOUS at 05:08

## 2023-08-25 RX ADMIN — PROPOFOL 5 MCG/KG/MIN: 10 INJECTION, EMULSION INTRAVENOUS at 05:08

## 2023-08-25 RX ADMIN — SODIUM BICARBONATE 50 MEQ: 84 INJECTION, SOLUTION INTRAVENOUS at 02:08

## 2023-08-25 RX ADMIN — METHYLPREDNISOLONE SODIUM SUCCINATE 60 MG: 125 INJECTION INTRAMUSCULAR; INTRAVENOUS at 12:08

## 2023-08-25 RX ADMIN — MUPIROCIN: 20 OINTMENT TOPICAL at 08:08

## 2023-08-25 RX ADMIN — AMIODARONE HYDROCHLORIDE 150 MG: 1.5 INJECTION, SOLUTION INTRAVENOUS at 05:08

## 2023-08-25 RX ADMIN — FENTANYL CITRATE 25 MCG/HR: 50 INJECTION, SOLUTION INTRAMUSCULAR; INTRAVENOUS at 12:08

## 2023-08-25 RX ADMIN — MUPIROCIN: 20 OINTMENT TOPICAL at 12:08

## 2023-08-25 RX ADMIN — METHYLPREDNISOLONE SODIUM SUCCINATE 60 MG: 125 INJECTION INTRAMUSCULAR; INTRAVENOUS at 11:08

## 2023-08-25 RX ADMIN — FENTANYL CITRATE 25 MCG/HR: 50 INJECTION, SOLUTION INTRAMUSCULAR; INTRAVENOUS at 01:08

## 2023-08-25 RX ADMIN — FUROSEMIDE 40 MG: 10 INJECTION, SOLUTION INTRAVENOUS at 08:08

## 2023-08-25 RX ADMIN — Medication 104 MG: at 01:08

## 2023-08-25 RX ADMIN — ASPIRIN 81 MG: 81 TABLET, COATED ORAL at 08:08

## 2023-08-25 RX ADMIN — NOREPINEPHRINE BITARTRATE 0.2 MCG/KG/MIN: 1 INJECTION, SOLUTION, CONCENTRATE INTRAVENOUS at 12:08

## 2023-08-25 RX ADMIN — SODIUM CHLORIDE: 9 INJECTION, SOLUTION INTRAVENOUS at 04:08

## 2023-08-25 RX ADMIN — AMIODARONE HYDROCHLORIDE 0.5 MG/MIN: 1.8 INJECTION, SOLUTION INTRAVENOUS at 11:08

## 2023-08-25 RX ADMIN — CALCIUM GLUCONATE 1 G: 20 INJECTION, SOLUTION INTRAVENOUS at 06:08

## 2023-08-25 RX ADMIN — AMIODARONE HYDROCHLORIDE 0.5 MG/MIN: 1.8 INJECTION, SOLUTION INTRAVENOUS at 01:08

## 2023-08-25 RX ADMIN — VENLAFAXINE HYDROCHLORIDE 150 MG: 75 CAPSULE, EXTENDED RELEASE ORAL at 08:08

## 2023-08-25 RX ADMIN — NOREPINEPHRINE BITARTRATE 0.14 MCG/KG/MIN: 4 INJECTION, SOLUTION INTRAVENOUS at 05:08

## 2023-08-25 RX ADMIN — AMIODARONE HYDROCHLORIDE 0.5 MG/MIN: 1.8 INJECTION, SOLUTION INTRAVENOUS at 05:08

## 2023-08-25 RX ADMIN — PIPERACILLIN AND TAZOBACTAM 4.5 G: 4; .5 INJECTION, POWDER, FOR SOLUTION INTRAVENOUS; PARENTERAL at 05:08

## 2023-08-25 RX ADMIN — PRAVASTATIN SODIUM 80 MG: 40 TABLET ORAL at 08:08

## 2023-08-25 RX ADMIN — SODIUM BICARBONATE: 84 INJECTION, SOLUTION INTRAVENOUS at 02:08

## 2023-08-25 RX ADMIN — SODIUM BICARBONATE: 84 INJECTION, SOLUTION INTRAVENOUS at 07:08

## 2023-08-25 NOTE — ASSESSMENT & PLAN NOTE
- Dr. Izaguirre has seen and evaluated this pt  - RHC yesterday showed normal right sided filling pressures with severely elevated left sided filling pressures (R-L discordance), low cardiac output and elevated systemic vascular resistance   - she was started on IV Milrinone 0.25mcg for low cardiac output and bidil bid for afterload reduction  - gentle diuresis with IV lasix 40mg daily was started  - overnight pt went into cardiogenic shock, required intubation and was transferred to the ICU  - she is currently sedated on the vent  - started dobutamine 5mcg today  - discontinued Milrinone due to worsening MAKENNA - nephrology is following  - lactate timothy to 11 overnight, has now decreased to 7.7  - plan for Impella placement and transfer for high level of care with advanced heart failure  - daily weights, strict I&Os

## 2023-08-25 NOTE — ASSESSMENT & PLAN NOTE
CAD s/p PCI p/w abdominal pain and poor PO tolerance. [place] [address]. TTE with LVEF 15-20%. S/p CorAngio notable for elevated LVEDP with CI 1.4. Initially managed with diuresis for concern of R-L discordance. Course with progression of hypotension into shock with uptrending LA. Initiated on inotropic support with persistent increase in LA; concern for concomitant dynamic LVOT obstruction. Degree of shock warranted mechanical LV support. Cardiology on board and appreciate their recommendations and management. Now s/p pLVAD with Impella. Plan for management as follows:  -   preload: received IVF during procedure; will administer gentle fluid hydration, appreciate Cardiology recs on CVP goal  - rhythm: AFib on amiodarone gtt and digoxine  - inotropy: s/p Impella P6, bedside POCUS on transfer for baseline device positioning, LA Q4H, ScvO2 Q4H to start. Will continue dobutamine at 2.5 mcg/kg/min  - afterload: n/a  - holding on systemic anticoagulation given INR elevation; this may be solely reflective of ongoing hepatic dysfunction. Unable to have heparin solely though line. Will follow serial INR and clinical evaluation for bleeding diathesis and consider systemic heparin gtt  - started on stress dose steroids for differential of adrenal insufficiency/crisis; will continue this, cortisol pending; additional indication includes septic shock  - Cardiology on board, appreciate recommendations

## 2023-08-25 NOTE — HPI
Ora Sewell is a 73 y/o female with PMHx of CAD with stents (placed in [facility] 5-10 years ago per pt), a-fib, right carotid endarterectomy, MAKENNA, HTN, HLD, kidney stone, gastritis, asthma, recent right shoulder arthroscopy. Pt was transferred her to [facility] from [facility] ED where she presented with vomiting x 4 days and unable to keep anything down including her medications. Pt denies chest pain but states she has experienced SOB and leg swelling over the last several days. She denies history of heart failure. Pt had c/o abdominal pain that she attributed to repetitive vomiting, weakness, dizziness, palpitations, and wheezing.     Pt was found to be in afib with RVR and was started on IV cardizem and BB. Pt has been on Eliquis but has not been compliant with taking it due to financial reasons.     Initial troponins were flat in the 700s x 2. Troponin was repeated this morning and was 176. NTpBNP is 23K. Echo shows EF of 15-20%, severe MR and dilated IVC.

## 2023-08-25 NOTE — ASSESSMENT & PLAN NOTE
Multifactorial. Cardiogenic shock and now with renal and hepatic dysfunction. Intubated. S/p pLVAD. Plan to sedate for goal RASS -1 to 0 given current lines and critical illness.   - start propofol gtt  - cont fentanyl gtt  - cardiogenic shock management as below  - will assess in am for sedation holiday candidacy

## 2023-08-25 NOTE — ASSESSMENT & PLAN NOTE
- Dr. Brothers has seen and evaluated this pt  - EF 15-20% with severe MR and dilated IVC  - Right and left heart cath today, keep NPO  - further recommendations to follow

## 2023-08-25 NOTE — ASSESSMENT & PLAN NOTE
Patient has hyponatremia which is uncontrolled,We will aim to correct the sodium by 4-6mEq in 24 hours. We will monitor sodium Daily. The hyponatremia is due to Dehydration/hypovolemia. We will treat the hyponatremia with IV fluids. The patient's sodium results have been reviewed and are listed below.  Recent Labs   Lab 08/24/23  [id number]   *       Worsening tachcardia

## 2023-08-25 NOTE — HOSPITAL COURSE
08/24 Records reviewed. Presented nausea several days from outside hospital. No fever. No change in stool. No obvious bleeding. GI W/U in 2021 with gastritis and diverticulitis. Been on iron but stopped. Doesn't eat dirt. Does take daily ibuprofen. Recent EKG showed AF and treated for increase rate. Diltiazem drip helped. Added some IVF. Has increase troponin but no CP. Hx cardiac stent 4 years prior in [place].  No cardiologist here. Pt has since moved to this area. Asked cardiology to see. Also spoke with Dr Garcia. He is to consider EGD but wanted to have cardiology see and HR controled then consider. Has marked iron deficiency but not anemic. Home CPAP but says doesn't wear nightly.

## 2023-08-25 NOTE — NURSING
19:28 Pt getting anxious and agitated, pulling off her venti mask and trying to get out of the bed. Informed her to keep her mask on as her oxygen drops. Notified MD and order given for tranxene.   21:11  MD notified of p's low BP- 71/50. Pt pulling down her mask and trying to get out of bed. Dr. Brothers aware of patient's condition.  21:30 Pt's toes looks more blue on both feet. 22g IV started on right upper chest for milrinone. O2 sats fluctuates 83%-90%. Dr. Stout informed Chana, charge nurse, and I via secure chat, she put in an order for ICU transfer. No rooms available at the moment per Chana charge nurse. unable to get another bp reading using monitor. Tried manual and cannot get a reading. Used doppler, systolic in the 70s.   21:55 RRT called.
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
## 2023-08-25 NOTE — ASSESSMENT & PLAN NOTE
- EF 15-20%, dilated IVC  - new diagnosis per pt  - attempting to get records from [facility]  - right and left heart cath today, keep NPO    08/25/23:  - LHC revealed single vessel CAD (70% mid R-PDA, LAD and LCx have separate ostium)  - RHC yesterday showed normal right sided filling pressures with severely elevated left sided filling pressures (R-L discordance), low cardiac output and elevated systemic vascular resistance   - she was started on IV Milrinone 0.25mcg for low cardiac output and bidil bid for afterload reduction  - gentle diuresis with IV lasix 40mg daily was started  - overnight pt went into cardiogenic shock, required intubation and was transferred to the ICU  - she is currently sedated on the vent  - started dobutamine 5mcg today  - discontinued Milrinone due to worsening MAKENNA - nephrology is following  - lactate timothy to 11 overnight, has now decreased to 7.7  - plan for Impella placement and transfer for higher level of care with advanced heart failure  - daily weights, strict I&Os

## 2023-08-25 NOTE — PROCEDURE NOTE ADDENDUM
Patient cyanotic and hypotensive with lactic acid over 7.  She is responsive but somewhat lethargic.  Easily redirected and following commands.  Unable to obtain ABG.  RR 40 and hypoxic.  Very agitated and pulling off NRB.  Will intubate.      RSI with 20 mg etomidate and 100 mg rocuronium.  Vocal cords easily visualized with glide scope.  No secretions or bleeding.  7.5 ETT placed at 24 cm to the lip.  CO2 detector changed color appropriately and breath sounds auscultated bilaterally.  Rales noted.  No wheeze or rhonci.  CXR shows ETT at the tip of the right main bronchus.  Will pull ETT back about 3 cm.
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
## 2023-08-25 NOTE — ASSESSMENT & PLAN NOTE
- pt reports hx of stent placement in [place] 5-10 years ago  - attempting to obtain records  - right and left heart cath today  - further recommendations to follow

## 2023-08-25 NOTE — PLAN OF CARE
Problem: Adult Inpatient Plan of Care  Goal: Plan of Care Review  Outcome: Ongoing, Progressing  Goal: Patient-Specific Goal (Individualized)  Outcome: Ongoing, Progressing  Goal: Absence of Hospital-Acquired Illness or Injury  Outcome: Ongoing, Progressing  Intervention: Identify and Manage Fall Risk  Flowsheets (Taken 8/25/2023 1211)  Safety Promotion/Fall Prevention:   assistive device/personal item within reach   bed alarm set   side rails raised x 3  Intervention: Prevent Skin Injury  Flowsheets (Taken 8/25/2023 1211)  Body Position:   turned   right  Intervention: Prevent and Manage VTE (Venous Thromboembolism) Risk  Flowsheets (Taken 8/25/2023 1211)  VTE Prevention/Management:   remove, assess skin, and reapply sequential compression device   dorsiflexion/plantar flexion performed  Intervention: Prevent Infection  Flowsheets (Taken 8/25/2023 1211)  Infection Prevention: equipment surfaces disinfected  Goal: Optimal Comfort and Wellbeing  Outcome: Ongoing, Progressing  Goal: Readiness for Transition of Care  Outcome: Ongoing, Progressing     Problem: Fluid and Electrolyte Imbalance (Acute Kidney Injury/Impairment)  Goal: Fluid and Electrolyte Balance  Outcome: Ongoing, Progressing     Problem: Oral Intake Inadequate (Acute Kidney Injury/Impairment)  Goal: Optimal Nutrition Intake  Outcome: Ongoing, Progressing     Problem: Renal Function Impairment (Acute Kidney Injury/Impairment)  Goal: Effective Renal Function  Outcome: Ongoing, Progressing     Problem: Fluid Imbalance (Pneumonia)  Goal: Fluid Balance  Outcome: Ongoing, Progressing  Intervention: Monitor and Manage Fluid Balance  Flowsheets (Taken 8/25/2023 1211)  Fluid/Electrolyte Management: intravenous fluids adjusted     Problem: Infection (Pneumonia)  Goal: Resolution of Infection Signs and Symptoms  Outcome: Ongoing, Progressing     Problem: Respiratory Compromise (Pneumonia)  Goal: Effective Oxygenation and Ventilation  Outcome: Ongoing,  Progressing     Problem: Gas Exchange Impaired  Goal: Optimal Gas Exchange  Outcome: Ongoing, Progressing  Intervention: Optimize Oxygenation and Ventilation  Flowsheets (Taken 8/25/2023 1211)  Airway/Ventilation Management:   airway patency maintained   calming measures promoted  Head of Bed (HOB) Positioning: HOB at 30-45 degrees     Problem: Infection  Goal: Absence of Infection Signs and Symptoms  Outcome: Ongoing, Progressing     Problem: Skin Injury Risk Increased  Goal: Skin Health and Integrity  Outcome: Ongoing, Progressing     Problem: Communication Impairment (Mechanical Ventilation, Invasive)  Goal: Effective Communication  Outcome: Ongoing, Progressing     Problem: Device-Related Complication Risk (Mechanical Ventilation, Invasive)  Goal: Optimal Device Function  Outcome: Ongoing, Progressing     Problem: Inability to Wean (Mechanical Ventilation, Invasive)  Goal: Mechanical Ventilation Liberation  Outcome: Ongoing, Progressing     Problem: Nutrition Impairment (Mechanical Ventilation, Invasive)  Goal: Optimal Nutrition Delivery  Outcome: Ongoing, Progressing     Problem: Skin and Tissue Injury (Mechanical Ventilation, Invasive)  Goal: Absence of Device-Related Skin and Tissue Injury  Outcome: Ongoing, Progressing     Problem: Ventilator-Induced Lung Injury (Mechanical Ventilation, Invasive)  Goal: Absence of Ventilator-Induced Lung Injury  Outcome: Ongoing, Progressing     Problem: Communication Impairment (Artificial Airway)  Goal: Effective Communication  Outcome: Ongoing, Progressing     Problem: Device-Related Complication Risk (Artificial Airway)  Goal: Optimal Device Function  Outcome: Ongoing, Progressing     Problem: Skin and Tissue Injury (Artificial Airway)  Goal: Absence of Device-Related Skin or Tissue Injury  Outcome: Ongoing, Progressing     Problem: Noninvasive Ventilation Acute  Goal: Effective Unassisted Ventilation and Oxygenation  Outcome: Ongoing, Progressing     Problem: Fall  Injury Risk  Goal: Absence of Fall and Fall-Related Injury  Outcome: Ongoing, Progressing     Problem: Restraint, Nonbehavioral (Nonviolent)  Goal: Absence of Harm or Injury  Outcome: Ongoing, Progressing

## 2023-08-25 NOTE — PROGRESS NOTES
[facility] Hospital Medicine  Progress Note    Patient Name: Ora Sewell  MRN: [id number]  Patient Class: IP- Inpatient   Admission Date: 8/23/2023  Length of Stay: 2 days  Attending Physician: Jacy Muro MD  Primary Care Provider: Asad Mendez IV, DO        Subjective:     Principal Problem:Cannabinoid hyperemesis syndrome        HPI:  Patient is a 73 y/o female who presents to the [facility] transferred from [facility] ED with complaint of vomiting for 4 days. She states the vomiting started out of no where and has been occurring around every 2 hours. She is not able to keep anything down including her meds. Motion makes her symptoms worse. Sometimes turning off all of the light helps the nausea go away. Patient also reports fever that occurred the first day of her illness, abdominal pain that she attributes to repetitive vomiting, weakness in her legs, dizziness, shortness of breath, wheezing leg swelling, periodic palpations, and easy bruising and heartburn that occurs every 2 weeks. She also reports history of melena with the last occurrence a year ago. Patient denies hematemesis, urinary problems,vision changes, chest pain. She also denies any sick contacts. Patient with a significant PMH of MAKENNA, Atrial fibrillation, hypertension, hyperlipidemia,CAD, gastritis, kidney stones, and asthma.    Prior hospitalizations:   8/10/23: Complete R rotator cuff tear   10/08/2021: symptomatic anemia   4/13/21: anemia   3/06/21: anemia   10/08/2018:  Bilateral carotid stent placement         Prior studies:   8/10/23- EKG:Atrial fibrillation with rapid ventricular response   Septal infarct ,age undetermined      10/08/21:-CT CHEST WITHOUT CONTRAST- . Small left pleural effusion with bibasilar dependent atelectasis.  2. Small bilateral pulmonary nodules.  Follow-up per Fleischner guidelines.  For multiple solid nodules all <6 mm, Fleischner Society 2017 guidelines recommend no routine follow up  for a low risk patient, or follow up with non-contrast chest CT at 12 months after discovery in a high risk patient.  3. Granulomatous change.      3/8/21: EGD: - Normal esophagus.                         - Gastritis.                         - Normal examined duodenum.                         - No specimens collected.       4/13/21: Colonoscopy: Diverticulosis in the sigmoid colon and in the                          descending colon.                          - Non-bleeding internal hemorrhoids.                          - The examination was otherwise normal.                          - No specimens collected.       10/08/21:CT ABDOMEN PELVIS WITHOUT CONTRAST- 1. Small left pleural effusion with dependent discoid atelectasis the lung bases.  2. Suspected bilateral adrenal hyperplasia.  3. Findings consistent with acute diverticulitis of the distal descending colon.  Correlate clinically with possible fever and/or elevated white count.  This should be followed until clear to exclude underlying suspicious colonic lesion.  4. Prior hysterectomy.      3/7/21 Echo: EF 60%      Overview/Hospital Course:  08/24 Records reviewed. Presented nausea several days from outside hospital. No fever. No change in stool. No obvious bleeding. GI W/U in 2021 with gastritis and diverticulitis. Been on iron but stopped. Doesn't eat dirt. Does take daily ibuprofen. Recent EKG showed AF and treated for increase rate. Diltiazem drip helped. Added some IVF. Has increase troponin but no CP. Hx cardiac stent 4 years prior in [place].  No cardiologist here. Pt has since moved to this area. Asked cardiology to see. Also spoke with Dr Garcia. He is to consider EGD but wanted to have cardiology see and HR controled then consider. Has marked iron deficiency but not anemic. Home CPAP but says doesn't wear nightly.      Interval History:       Review of Systems   Constitutional:  Positive for fatigue. Negative for appetite change and fever.  patient BP of 199/97   HENT:  Negative for congestion, hearing loss and trouble swallowing.    Respiratory:  Negative for chest tightness, shortness of breath and wheezing.    Cardiovascular:  Negative for chest pain and palpitations.   Gastrointestinal:  Positive for abdominal pain, nausea and vomiting. Negative for constipation.   Genitourinary:  Negative for difficulty urinating and dysuria.   Musculoskeletal:  Negative for back pain and neck stiffness.   Skin:  Negative for pallor and rash.   Neurological:  Negative for dizziness, speech difficulty and headaches.   Psychiatric/Behavioral:  Negative for confusion and suicidal ideas.      Objective:     Vital Signs (Most Recent):  Temp: 97.6 °F (36.4 °C) (08/24/23 1901)  Pulse: 90 (08/24/23 2342)  Resp: (!) 24 (08/24/23 2155)  BP: 104/84 (08/24/23 2014)  SpO2: (!) 89 % (08/24/23 2155) Vital Signs (24h Range):  Temp:  [97.2 °F (36.2 °C)-98.3 °F (36.8 °C)] 97.6 °F (36.4 °C)  Pulse:  [] 90  Resp:  [14-24] 24  SpO2:  [81 %-98 %] 89 %  BP: ()/() 104/84     Weight: 104.3 kg (230 lb)  Body mass index is 37.12 kg/m².  No intake or output data in the 24 hours ending 08/25/23 0009      Physical Exam  Vitals reviewed.   Constitutional:       General: She is awake. She is not in acute distress.     Appearance: She is well-developed. She is obese. She is not toxic-appearing.   HENT:      Head: Normocephalic.      Nose: Nose normal.      Mouth/Throat:      Pharynx: Oropharynx is clear.   Eyes:      Extraocular Movements: Extraocular movements intact.      Pupils: Pupils are equal, round, and reactive to light.   Neck:      Thyroid: No thyroid mass.      Vascular: No carotid bruit.   Cardiovascular:      Rate and Rhythm: Normal rate and regular rhythm.      Pulses: Normal pulses.      Heart sounds: Normal heart sounds. No murmur heard.  Pulmonary:      Effort: Pulmonary effort is normal.      Breath sounds: Normal breath sounds and air entry. No wheezing.   Abdominal:      General:  Bowel sounds are normal. There is no distension.      Palpations: Abdomen is soft.      Tenderness: There is abdominal tenderness in the right upper quadrant. There is no guarding or rebound.   Musculoskeletal:         General: Normal range of motion.      Cervical back: Neck supple. No rigidity.   Skin:     General: Skin is warm.      Coloration: Skin is not jaundiced.      Findings: No lesion.   Neurological:      General: No focal deficit present.      Mental Status: She is alert and oriented to person, place, and time.      Cranial Nerves: No cranial nerve deficit.   Psychiatric:         Attention and Perception: Attention normal.         Mood and Affect: Mood normal.         Behavior: Behavior normal. Behavior is cooperative.         Thought Content: Thought content normal.         Cognition and Memory: Cognition normal.             Significant Labs: All pertinent labs within the past 24 hours have been reviewed.  BMP:   Recent Labs   Lab 08/24/23  0648      *   K 4.4   CL 97*   CO2 25   BUN 31*   CREATININE 1.87*   CALCIUM 8.6   MG 2.3     CBC:   Recent Labs   Lab 08/23/23  1208 08/24/23  0648   WBC 19.50* 20.17*   HGB 14.4 13.3   HCT 46.5 42.2   * 379     CMP:   Recent Labs   Lab 08/23/23  1208 08/24/23  0648   * 134*   K 3.2* 4.4   CL 95* 97*   CO2 22 25   * 106   BUN 25* 31*   CREATININE 1.97* 1.87*   CALCIUM 9.1 8.6   PROT 7.0  --    ALBUMIN 3.2*  --    BILITOT 0.5  --    ALKPHOS 107  --    AST 30  --    ALT 16  --    ANIONGAP 20* 16       Significant Imaging: I have reviewed all pertinent imaging results/findings within the past 24 hours.      Assessment/Plan:      * Cannabinoid hyperemesis syndrome  Bilious vomiting for the last 4 days. Unable to keep anything even medications down.  KUB showing non obstructive pattern  DDx include gastric outlet obstruction  Start IV Reglan PRN and IVF  Monitor electrolytes and fluid status  Consult GI      Elevated troponin    Asked  cardiology to see with hx CAD but likely demand ischemia and type 2 MI    CAD (coronary artery disease)    Stent 4 years prior in [facility]    Hypomagnesemia  Magnesium 1.5  Replenished with IV mag sulfate  Monitor AM Magnesium    Hypokalemia  Potassium 3.2  Replenished with IV KCL  Monitor AM BMP    Elevated brain natriuretic peptide (BNP) level  Elevated p-BNP 09298 from 3760 three years ago  Last Echo 2021 EF 60%  Will obtain a new Echo      Aspiration pneumonitis  Clinical picture not consistent with pneumonia at the moment  Chest Xray showing pneumonitis  Monitor patient and in case fever or elevated WBC start antibiotics      Demand ischemia  Troponin elevated but flat 716 and 716, repeated pending       Atrial fibrillation with rapid ventricular response  Patient with Long standing persistent (>12 months) atrial fibrillation which is controlled currently with Beta Blocker. Patient is currently in sinus rhythm.[id number] Score: 3. HASBLED Score: 1. Patient has not been compliant with home Eliquis. States she can't afford. Restart anticoagulation in case no need of procedure or surgical intervention.    Dehydration with hyponatremia  Patient has hyponatremia which is uncontrolled,We will aim to correct the sodium by 4-6mEq in 24 hours. We will monitor sodium Daily. The hyponatremia is due to Dehydration/hypovolemia. We will treat the hyponatremia with IV fluids. The patient's sodium results have been reviewed and are listed below.  Recent Labs   Lab 08/24/23  0648   *       Worsening tachcardia    MAKENNA (acute kidney injury)  Patient with acute kidney injury/acute renal failure likely due to pre-renal azotemia due to dehydration MAKENNA is currently worsening. Baseline creatinine 1.3 one year ago - Labs reviewed- Renal function/electrolytes with Estimated Creatinine Clearance: 30.6 mL/min (A) (based on SCr of 1.97 mg/dL (H)). according to latest data. Monitor urine output and serial BMP and adjust therapy as  Patient hasn't voided all shift needed. Avoid nephrotoxins and renally dose meds for GFR listed above. IVF started.      VTE Risk Mitigation (From admission, onward)         Ordered     enoxaparin injection 105 mg  Every 12 hours         08/24/23 2108     Place GLADYS hose  Until discontinued         08/23/23 2119     IP VTE HIGH RISK PATIENT  Once         08/23/23 2119                Discharge Planning   DOM:      Code Status: Full Code   Is the patient medically ready for discharge?:     Reason for patient still in hospital (select all that apply): Laboratory test, Treatment and Imaging  Discharge Plan A: Home with family                  Ji Garcia MD  Department of Hospital Medicine   [facility]

## 2023-08-25 NOTE — SUBJECTIVE & OBJECTIVE
Interval History: Cardiogenic shock overnight. Transferred to ICU. Currently sedated on vent.    Review of Systems   Unable to perform ROS: Intubated     Objective:     Vital Signs (Most Recent):  Temp: 97.9 °F (36.6 °C) (08/25/23 1355)  Pulse: (!) 157 (08/25/23 1355)  Resp: (!) 27 (08/25/23 1355)  BP: (!) 116/35 (08/25/23 1355)  SpO2: 97 % (08/25/23 1355) Vital Signs (24h Range):  Temp:  [93.9 °F (34.4 °C)-97.9 °F (36.6 °C)] 97.9 °F (36.6 °C)  Pulse:  [] 157  Resp:  [12-37] 27  SpO2:  [81 %-98 %] 97 %  BP: ()/() 116/35     Weight: 104.3 kg (230 lb)  Body mass index is 37.12 kg/m².     SpO2: 97 %         Intake/Output Summary (Last 24 hours) at 8/25/2023 1428  Last data filed at 8/25/2023 1400  Gross per 24 hour   Intake 2373.16 ml   Output 50 ml   Net 2323.16 ml       Lines/Drains/Airways       Central Venous Catheter Line  Duration             Percutaneous Central Line Insertion/Assessment - Triple Lumen  08/25/23 1215 Internal Jugular Left <1 day              Drain  Duration                  NG/OG Tube 08/25/23 0000 Center mouth <1 day         Urethral Catheter 08/24/23 2350 <1 day              Airway  Duration                  Airway - Non-Surgical 08/24/23 2349 Endotracheal Tube <1 day              Peripheral Intravenous Line  Duration                  Peripheral IV - Single Lumen 08/24/23 0034 22 G Distal;Left;Posterior Forearm 1 day         Peripheral IV - Single Lumen 08/24/23 2322 22 G Anterior;Right Shoulder <1 day         Peripheral IV - Single Lumen 08/24/23 2333 20 G Anterior;Left Forearm <1 day                       Physical Exam  Vitals reviewed.   Constitutional:       General: She is not in acute distress.     Appearance: She is obese. She is ill-appearing.   Eyes:      Pupils: Pupils are equal, round, and reactive to light.   Cardiovascular:      Rate and Rhythm: Tachycardia present. Rhythm irregularly irregular.      Pulses: Normal pulses.      Heart sounds: Normal heart sounds.    Pulmonary:      Effort: Pulmonary effort is normal.   Abdominal:      Palpations: Abdomen is soft.   Musculoskeletal:      Right lower leg: No edema.      Left lower leg: No edema.   Neurological:      Comments: Sedated on vent            Significant Labs: All pertinent lab results from the last 24 hours have been reviewed. and   Recent Lab Results  (Last 5 results in the past 24 hours)        08/25/23  1311   08/25/23  1050   08/25/23  1032   08/25/23  0902   08/25/23  0706        Albumin   2.7             Alkaline Phosphatase   123             ALT   4,233             Amorphous Crystals, UA     Occ           Appearance, UA     Ex.Turbid           AST   14,751             Bacteria, UA     Few           Bilirubin (UA)     Negative           Bilirubin Direct   3.0             BILIRUBIN TOTAL   3.9             Color, UA     Dark-Yellow           Glucose, UA     70           Ketones, UA     Negative           Lactate, Willis 7.7  Comment: A repeat order for Lactic Acid has been placed for collection in 2 hours.   8.0  Comment: A repeat order for Lactic Acid has been placed for collection in 2 hours.     9.6  Comment: A repeat order for Lactic Acid has been placed for collection in 2 hours.   8.6  Comment: A repeat order for Lactic Acid has been placed for collection in 2 hours.       Leukocytes, UA     Negative           Mucous     Occasional           NITRITE UA     Negative           Occult Blood UA     Large           pH, UA     5.5           POC Base Excess               POC HCO3               POC PCO2               POC PH               POC PO2               POC SATURATED O2               PROTEIN TOTAL   5.5             Protein, UA     200           RBC, UA     >182           Specific Gravity, UA     1.036           Squamous Epithelial Cells, UA     Occasional           Trans Epithel, UA     Occasional           UROBILINOGEN UA     2           WBC Clumps, UA     Few           WBC, UA     56                                   Significant Imaging: Echocardiogram: Transthoracic echo (TTE) complete (Cupid Only):   Results for orders placed or performed during the hospital encounter of 08/23/23   Echo   Result Value Ref Range    BSA 2.2 m2    LVOT stroke volume 30.50 cm3    LVIDd 4.56 3.5 - 6.0 cm    LV Systolic Volume 67.30 mL    LV Systolic Volume Index 31.7 mL/m2    LVIDs 3.93 2.1 - 4.0 cm    LV Diastolic Volume 95.34 mL    LV Diastolic Volume Index 44.97 mL/m2    IVS 1.18 (A) 0.6 - 1.1 cm    LVOT diameter 1.49 cm    LVOT area 1.7 cm2    FS 14 (A) 28 - 44 %    Left Ventricle Relative Wall Thickness 0.62 cm    Posterior Wall 1.41 (A) 0.6 - 1.1 cm    LV mass 225.83 g    LV Mass Index 107 g/m2    TDI LATERAL 0.12 m/s    TDI SEPTAL 0.06 m/s    TR Max Jostin 2.95 m/s    E wave deceleration time 224.97 msec    LVOT peak jostin 1.18 m/s    Left Ventricular Outflow Tract Mean Velocity 0.71 cm/s    Left Ventricular Outflow Tract Mean Gradient 2.41 mmHg    LA size 3.95 cm    Left Atrium Major Axis 3.87 cm    RVDD 2.88 cm    TAPSE 1.13 cm    RA Major Axis 3.77 cm    AV mean gradient 4 mmHg    AV peak gradient 7 mmHg    Ao peak jostin 1.31 m/s    Ao VTI 18.20 cm    LVOT peak VTI 17.50 cm    AV valve area 1.68 cm²    AV Velocity Ratio 0.90     AV index (prosthetic) 0.96     HAN by Velocity Ratio 1.57 cm²    Mr max jostin 4.79 m/s    MV mean gradient 6 mmHg    MV peak gradient 13 mmHg    MV stenosis pressure 1/2 time 65.24 ms    MV valve area p 1/2 method 3.37 cm2    MV valve area by continuity eq 1.02 cm2    MV VTI 29.9 cm    Triscuspid Valve Regurgitation Peak Gradient 35 mmHg    PV PEAK VELOCITY 0.78 m/s    PV peak gradient 2 mmHg    Ao root annulus 2.51 cm    IVC diameter 1.92 cm    Mean e' 0.09 m/s    ZLVIDS -0.31     ZLVIDD -3.85     AORTIC VALVE CUSP SEPERATION 1.68 cm    EF 20 %    TV resting pulmonary artery pressure 50 mmHg    RV TB RVSP 18 mmHg    Est. RA pres 15 mmHg    Narrative      Left Ventricle: The left ventricle is normal in size. Mildly  increased   wall thickness. regional wall motion abnormalities present. See diagram   for wall motion findings. There is severely reduced systolic function with   a visually estimated ejection fraction of 15 - 20%. Ejection fraction by   visual approximation is 20%.    Left Atrium: Left atrium is moderately dilated.    Right Ventricle: Normal right ventricular cavity size. Systolic   function is normal.    Aortic Valve: The aortic valve is a trileaflet valve.    Mitral Valve: There is mild bileaflet sclerosis. Moderate posterior   mitral annular calcification. There is severe regurgitation with a   centrally directed jet.    Tricuspid Valve: There is mild regurgitation.    Pulmonary Artery: The estimated pulmonary artery systolic pressure is   50 mmHg.    IVC/SVC: Elevated venous pressure at 15 mmHg.    Pericardium: There is a trivial effusion.

## 2023-08-25 NOTE — CONSULTS
[facility] Consult History and Physical   Patient Name: Ora Sewell  MRN: [id number]  Age: 72 y.o.  : 1950  Time:  10:35 AM  Admission Date: 2023    Consulted for:  MAKENNA  Consulted by: Temi Candelario NP    HPI:   Ora Sewell is a 71 yo female with medical history significant for CAD, CHF, CKD III who presents to the hospital as a transfer from outside facility with chief complaint of nausea and vomiting for four days. She was admitted to [facility] on 23 with dehydration and a fib with RVR. She underwent a LHC/RHC on  with cardiology. She was found to have single vessel disease, R-L discordance. New onset drop in her EF to 15 % concerning for takasubo. In the past 24 hours, she has had significant clinical decline including cardiogenic shock, shock liver, and renal failure. She was started on milrinone, IV amiodarone, diltiazem and dobutamine all IV piggy back in D5W. She has developed worsening hyponatremia, hypochloremia and significantly worsening leukocytosis. Nephrology consulted for her MAKENNA      Past Medical History:  has a past medical history of Asthma, Cancer, CHF (congestive heart failure), Chronic atrial fibrillation, Coronary artery disease, Depression, recurrent (2023), Essential (primary) hypertension, Gastritis, Hyperlipidemia, Kidney stones, Marijuana dependence, On home O2, Primary osteoarthritis of right shoulder (2023), and Sciatic nerve disease.     Past Surgical History:   has a past surgical history that includes Coronary angioplasty with stent; Mandible fracture surgery; Carotid stent (Right, 10/08/2018); Carotid stent (Right, 10/08/2018); Carotid endarterectomy (N/A, 10/08/2018); Hysterectomy; Appendectomy; Esophagogastroduodenoscopy (N/A, 2021); Mouth surgery; Colonoscopy (N/A, 2021); Shoulder arthroscopy (Right, 08/10/2023); Rotator cuff repair (Right, 08/10/2023); and Arthroscopy of shoulder with  "decompression of subacromial space (Right, 08/10/2023).     Family History:  family history includes Diabetes in her maternal grandmother and paternal grandmother; Diabetes type II in her brother, brother, father, and mother; Heart attack in her paternal grandfather; Heart disease in her paternal grandmother; Hypertension in her brother; Lung cancer in her maternal grandfather. No family history of kidney disease.     Social History:   reports that she has been smoking cigars. She has never used smokeless tobacco. She reports current drug use. Drug: Marijuana. She reports that she does not drink alcohol.     Allergies: is allergic to carafate [sucralfate].     Medications prior to admission: Reviewed     Old records have been reviewed.       Review of Systems  ROS: A 10 point ROS was limited by [patient factors       Physical Exam:   BP (!) 121/49   Pulse (!) 123   Temp 96.8 °F (36 °C)   Resp (!) 24   Ht 5' 6" (1.676 m)   Wt 104.3 kg (230 lb)   SpO2 (!) 94%   Breastfeeding No   BMI 37.12 kg/m²     Constitutional: lying in bed, critically ill on vent  Eyes: EOMI, white sclera  ENMT: moist mucus membranes, nares patent  Cardiovascular: normal rate, S1/S2 noted, no edema  Respiratory: symmetrical chest expansion, CTA-B  Gastrointestinal: +BS, soft, NT/ND  Musculoskeletal: normal, no joint erythema/effusions  Skin: no rash, no purpura, warm extremities  Neurological: intubated, sedated     Data Review:  Lab:   Labs reviewed and significant values discussed below.    Recent Labs     08/24/23  0648 08/25/23  0443 08/25/23  0511   CALCIUM 8.6 7.3* 7.3*   * 128* 126*   K 4.4 4.4 4.5   CL 97* 87* 89*   CO2 25 17* 16*   BUN 31* 38* 38*   CREATININE 1.87* 2.86* 2.91*    146* 172*       Imaging:  Reviewed     Assessment/Plan:     Patient Active Problem List   Diagnosis    Symptomatic microcytic anemia     MAKENNA (acute kidney injury)    Depression, recurrent    Primary osteoarthritis of right shoulder    S/P " arthroscopy of right shoulder    Dehydration with hyponatremia    Intractable vomiting with nausea    Atrial fibrillation with rapid ventricular response    Demand ischemia    Aspiration pneumonitis    Elevated brain natriuretic peptide (BNP) level    Hypokalemia    Hypomagnesemia    Low cardiac output syndrome    Acute systolic heart failure    CAD (coronary artery disease)    Elevated troponin    Cardiogenic shock       MAKENNA  Lactic acidosis  Cardiogenic shock  Metabolic acidosis  Hyponatremia  Hypochloremia  Shock liver     - Patient critically ill. MAKENNA multifactorial in setting of hemodynamic instability, contrast exposure, sepsis. She has developed cardiogenic shock in the past 12-24 hours with significant clinical decline.   - Discussed with ICU team. Ordering random cortisol.  UA reviewed. Adding stress dose steroids. Trying to back off some of her D5 piggy back solutions for her hyponatremia. Will try giving some gentle NS for her hyponatremia, hypochloremia given her history of N/V for the past week. Very gentle rate given her EF. If she has no signs of clinical improvement she may require RRT.   -Consider transfer for advanced heart failure management and CRRT.   - Please avoid nephrotoxic agents/NSAIDs  - Renally dose all medications   - Please obtain daily BMP, Mg, and Phos levels  - Please monitor strict UOP  - Daily weights    Thank you for the consult. Will follow along. Please call with questions.    Alisha S. Parker, DO [facility]   08/25/2023

## 2023-08-25 NOTE — PLAN OF CARE
Problem: Adult Inpatient Plan of Care  Goal: Plan of Care Review  Outcome: Ongoing, Progressing  Goal: Patient-Specific Goal (Individualized)  Outcome: Ongoing, Progressing  Goal: Absence of Hospital-Acquired Illness or Injury  Outcome: Ongoing, Progressing  Goal: Optimal Comfort and Wellbeing  Outcome: Ongoing, Progressing  Goal: Readiness for Transition of Care  Outcome: Ongoing, Progressing     Problem: Fluid and Electrolyte Imbalance (Acute Kidney Injury/Impairment)  Goal: Fluid and Electrolyte Balance  Outcome: Ongoing, Progressing     Problem: Oral Intake Inadequate (Acute Kidney Injury/Impairment)  Goal: Optimal Nutrition Intake  Outcome: Ongoing, Progressing     Problem: Renal Function Impairment (Acute Kidney Injury/Impairment)  Goal: Effective Renal Function  Outcome: Ongoing, Progressing     Problem: Fluid Imbalance (Pneumonia)  Goal: Fluid Balance  Outcome: Ongoing, Progressing     Problem: Infection (Pneumonia)  Goal: Resolution of Infection Signs and Symptoms  Outcome: Ongoing, Progressing     Problem: Respiratory Compromise (Pneumonia)  Goal: Effective Oxygenation and Ventilation  Outcome: Ongoing, Progressing     Problem: Gas Exchange Impaired  Goal: Optimal Gas Exchange  Outcome: Ongoing, Progressing

## 2023-08-25 NOTE — PLAN OF CARE
Per rounds. Patient was transferred for ICS. Placed on vent. Patient is on multiple iv meds. Cm will follow.

## 2023-08-25 NOTE — PROGRESS NOTES
[facility]  Cardiology  Progress Note    Patient Name: Ora Sewell  MRN: [id number]  Admission Date: 8/23/2023  Hospital Length of Stay: 2 days  Code Status: Full Code   Attending Physician: Jacy Muro MD   Primary Care Physician: Asad Mendez IV, DO  Expected Discharge Date:   Principal Problem:Cardiogenic shock    Subjective:     Interval History: Cardiogenic shock overnight. Transferred to ICU. Currently sedated on vent.    Review of Systems   Unable to perform ROS: Intubated     Objective:     Vital Signs (Most Recent):  Temp: 97.9 °F (36.6 °C) (08/25/23 1355)  Pulse: (!) 157 (08/25/23 1355)  Resp: (!) 27 (08/25/23 1355)  BP: (!) 116/35 (08/25/23 1355)  SpO2: 97 % (08/25/23 1355) Vital Signs (24h Range):  Temp:  [93.9 °F (34.4 °C)-97.9 °F (36.6 °C)] 97.9 °F (36.6 °C)  Pulse:  [] 157  Resp:  [12-37] 27  SpO2:  [81 %-98 %] 97 %  BP: ()/() 116/35     Weight: 104.3 kg (230 lb)  Body mass index is 37.12 kg/m².     SpO2: 97 %         Intake/Output Summary (Last 24 hours) at 8/25/2023 1428  Last data filed at 8/25/2023 1400  Gross per 24 hour   Intake 2373.16 ml   Output 50 ml   Net 2323.16 ml       Lines/Drains/Airways       Central Venous Catheter Line  Duration             Percutaneous Central Line Insertion/Assessment - Triple Lumen  08/25/23 1215 Internal Jugular Left <1 day              Drain  Duration                  NG/OG Tube 08/25/23 0000 Center mouth <1 day         Urethral Catheter 08/24/23 2350 <1 day              Airway  Duration                  Airway - Non-Surgical 08/24/23 2349 Endotracheal Tube <1 day              Peripheral Intravenous Line  Duration                  Peripheral IV - Single Lumen 08/24/23 0034 22 G Distal;Left;Posterior Forearm 1 day         Peripheral IV - Single Lumen 08/24/23 2322 22 G Anterior;Right Shoulder <1 day         Peripheral IV - Single Lumen 08/24/23 2333 20 G Anterior;Left Forearm <1 day                       Physical  Exam  Vitals reviewed.   Constitutional:       General: She is not in acute distress.     Appearance: She is obese. She is ill-appearing.   Eyes:      Pupils: Pupils are equal, round, and reactive to light.   Cardiovascular:      Rate and Rhythm: Tachycardia present. Rhythm irregularly irregular.      Pulses: Normal pulses.      Heart sounds: Normal heart sounds.   Pulmonary:      Effort: Pulmonary effort is normal.   Abdominal:      Palpations: Abdomen is soft.   Musculoskeletal:      Right lower leg: No edema.      Left lower leg: No edema.   Neurological:      Comments: Sedated on vent            Significant Labs: All pertinent lab results from the last 24 hours have been reviewed. and   Recent Lab Results  (Last 5 results in the past 24 hours)        08/25/23  1311   08/25/23  1050   08/25/23  1032   08/25/23  0902   08/25/23  0706        Albumin   2.7             Alkaline Phosphatase   123             ALT   4,233             Amorphous Crystals, UA     Occ           Appearance, UA     Ex.Turbid           AST   14,751             Bacteria, UA     Few           Bilirubin (UA)     Negative           Bilirubin Direct   3.0             BILIRUBIN TOTAL   3.9             Color, UA     Dark-Yellow           Glucose, UA     70           Ketones, UA     Negative           Lactate, Willis 7.7  Comment: A repeat order for Lactic Acid has been placed for collection in 2 hours.   8.0  Comment: A repeat order for Lactic Acid has been placed for collection in 2 hours.     9.6  Comment: A repeat order for Lactic Acid has been placed for collection in 2 hours.   8.6  Comment: A repeat order for Lactic Acid has been placed for collection in 2 hours.       Leukocytes, UA     Negative           Mucous     Occasional           NITRITE UA     Negative           Occult Blood UA     Large           pH, UA     5.5           POC Base Excess               POC HCO3               POC PCO2               POC PH               POC PO2                POC SATURATED O2               PROTEIN TOTAL   5.5             Protein, UA     200           RBC, UA     >182           Specific Gravity, UA     1.036           Squamous Epithelial Cells, UA     Occasional           Trans Epithel, UA     Occasional           UROBILINOGEN UA     2           WBC Clumps, UA     Few           WBC, UA     56                                  Significant Imaging: Echocardiogram: Transthoracic echo (TTE) complete (Cupid Only):   Results for orders placed or performed during the hospital encounter of 08/23/23   Echo   Result Value Ref Range    BSA 2.2 m2    LVOT stroke volume 30.50 cm3    LVIDd 4.56 3.5 - 6.0 cm    LV Systolic Volume 67.30 mL    LV Systolic Volume Index 31.7 mL/m2    LVIDs 3.93 2.1 - 4.0 cm    LV Diastolic Volume 95.34 mL    LV Diastolic Volume Index 44.97 mL/m2    IVS 1.18 (A) 0.6 - 1.1 cm    LVOT diameter 1.49 cm    LVOT area 1.7 cm2    FS 14 (A) 28 - 44 %    Left Ventricle Relative Wall Thickness 0.62 cm    Posterior Wall 1.41 (A) 0.6 - 1.1 cm    LV mass 225.83 g    LV Mass Index 107 g/m2    TDI LATERAL 0.12 m/s    TDI SEPTAL 0.06 m/s    TR Max Jostin 2.95 m/s    E wave deceleration time 224.97 msec    LVOT peak jostin 1.18 m/s    Left Ventricular Outflow Tract Mean Velocity 0.71 cm/s    Left Ventricular Outflow Tract Mean Gradient 2.41 mmHg    LA size 3.95 cm    Left Atrium Major Axis 3.87 cm    RVDD 2.88 cm    TAPSE 1.13 cm    RA Major Axis 3.77 cm    AV mean gradient 4 mmHg    AV peak gradient 7 mmHg    Ao peak jostin 1.31 m/s    Ao VTI 18.20 cm    LVOT peak VTI 17.50 cm    AV valve area 1.68 cm²    AV Velocity Ratio 0.90     AV index (prosthetic) 0.96     HAN by Velocity Ratio 1.57 cm²    Mr max jostin 4.79 m/s    MV mean gradient 6 mmHg    MV peak gradient 13 mmHg    MV stenosis pressure 1/2 time 65.24 ms    MV valve area p 1/2 method 3.37 cm2    MV valve area by continuity eq 1.02 cm2    MV VTI 29.9 cm    Triscuspid Valve Regurgitation Peak Gradient 35 mmHg    PV PEAK VELOCITY  0.78 m/s    PV peak gradient 2 mmHg    Ao root annulus 2.51 cm    IVC diameter 1.92 cm    Mean e' 0.09 m/s    ZLVIDS -0.31     ZLVIDD -3.85     AORTIC VALVE CUSP SEPERATION 1.68 cm    EF 20 %    TV resting pulmonary artery pressure 50 mmHg    RV TB RVSP 18 mmHg    Est. RA pres 15 mmHg    Narrative      Left Ventricle: The left ventricle is normal in size. Mildly increased   wall thickness. regional wall motion abnormalities present. See diagram   for wall motion findings. There is severely reduced systolic function with   a visually estimated ejection fraction of 15 - 20%. Ejection fraction by   visual approximation is 20%.    Left Atrium: Left atrium is moderately dilated.    Right Ventricle: Normal right ventricular cavity size. Systolic   function is normal.    Aortic Valve: The aortic valve is a trileaflet valve.    Mitral Valve: There is mild bileaflet sclerosis. Moderate posterior   mitral annular calcification. There is severe regurgitation with a   centrally directed jet.    Tricuspid Valve: There is mild regurgitation.    Pulmonary Artery: The estimated pulmonary artery systolic pressure is   50 mmHg.    IVC/SVC: Elevated venous pressure at 15 mmHg.    Pericardium: There is a trivial effusion.       Assessment and Plan:     Brief HPI: Ora Sewell is a 71 y/o female with PMHx of CAD with stents (placed in [facility] 5-10 years ago per pt), a-fib, right carotid endarterectomy, MAKENNA, HTN, HLD, kidney stone, gastritis, asthma, recent right shoulder arthroscopy. Pt was transferred her to [facility] from [facility] ED where she presented with vomiting x 4 days and unable to keep anything down including her medications. Pt denies chest pain but states she has experienced SOB and leg swelling over the last several days. She denies history of heart failure. Pt had c/o abdominal pain that she attributed to repetitive vomiting, weakness, dizziness, palpitations, and wheezing.     Pt was found to be in afib  with RVR and was started on IV cardizem and BB. Pt has been on Eliquis but has not been compliant with taking it due to financial reasons.     Initial troponins were flat in the 700s x 2. Troponin was repeated this morning and was 176. NTpBNP is 23K. Echo shows EF of 15-20%, severe MR and dilated IVC.         * Cardiogenic shock  - lactate timothy to 11 overnight, has decreased to 7.7  - requiring Levophed  - discontinued Milrinone due to worsening MAKENNA - nephrology following  - start dobutamine 5mcg  - plan for Impella placement and transfer for higher level of care with advanced heart failure    CAD (coronary artery disease)  - single vessel nonobstructive CAD (70% mid R-PDA, LAD and LCx have separate ostium)  - continue ASA   - no statin at this time due to elevated liver enzymes 2/2 cardiogenic shock    Acute systolic heart failure  - EF 15-20%, dilated IVC  - new diagnosis per pt  - attempting to get records from [facility]  - right and left heart cath today, keep NPO    08/25/23:  - LHC revealed single vessel CAD (70% mid R-PDA, LAD and LCx have separate ostium)  - RHC yesterday showed normal right sided filling pressures with severely elevated left sided filling pressures (R-L discordance), low cardiac output and elevated systemic vascular resistance   - she was started on IV Milrinone 0.25mcg for low cardiac output and bidil bid for afterload reduction  - gentle diuresis with IV lasix 40mg daily was started  - overnight pt went into cardiogenic shock, required intubation and was transferred to the ICU  - she is currently sedated on the vent  - started dobutamine 5mcg today  - discontinued Milrinone due to worsening MAKENNA - nephrology is following  - lactate timothy to 11 overnight, has now decreased to 7.7  - plan for Impella placement and transfer for higher level of care with advanced heart failure  - daily weights, strict I&Os    Low cardiac output syndrome  - Dr. Izaguirre has seen and evaluated this  pt  - RHC yesterday showed normal right sided filling pressures with severely elevated left sided filling pressures (R-L discordance), low cardiac output and elevated systemic vascular resistance   - she was started on IV Milrinone 0.25mcg for low cardiac output and bidil bid for afterload reduction  - gentle diuresis with IV lasix 40mg daily was started  - overnight pt went into cardiogenic shock, required intubation and was transferred to the ICU  - she is currently sedated on the vent  - started dobutamine 5mcg today  - discontinued Milrinone due to worsening MAKENNA - nephrology is following  - lactate timothy to 11 overnight, has now decreased to 7.7  - plan for Impella placement and transfer for high level of care with advanced heart failure  - daily weights, strict I&Os    Atrial fibrillation with rapid ventricular response  - remains afib with RVR on IV amiodarone  - hx chronic afib  - no BB due to low output  - weight based lovenox renal dose for AC  - pt is critically ill, in cardiogenic shock  - risk of XIOMY for DCCV outweigh benefits    MAKENNA (acute kidney injury)  - creatinine increased to 2.9 today from 1.87 yesterday  - nephrology has been consulted        VTE Risk Mitigation (From admission, onward)         Ordered     enoxaparin injection 105 mg  Every 24 hours         08/25/23 0943     Place GLADYS hose  Until discontinued         08/23/23 2119     IP VTE HIGH RISK PATIENT  Once         08/23/23 2119                Pau Grijalva, MINH  Cardiology  [facility]

## 2023-08-25 NOTE — ASSESSMENT & PLAN NOTE
INR elevated in patient with shock liver. Serial INR measurement. Will reassess serially for anticoagulation candidacy in this patient with Afib and now with pLVAD.

## 2023-08-25 NOTE — ASSESSMENT & PLAN NOTE
Patient with Long standing persistent (>12 months) atrial fibrillation which is controlled currently with Beta Blocker. Patient is currently in sinus rhythm.[id number] Score: 3. HASBLED Score: 1. Patient has not been compliant with home Eliquis. States she can't afford. Restart anticoagulation in case no need of procedure or surgical intervention.

## 2023-08-25 NOTE — ASSESSMENT & PLAN NOTE
Aspiration event. Concern for multifactorial shock.   - cont Linezolid IV and Zosyn IV  - BCx, tracheal Cxs, urine Cxs sent, f/u  - f/u MRSA swab

## 2023-08-25 NOTE — ASSESSMENT & PLAN NOTE
- lactate timothy to 11 overnight, has decreased to 7.7  - requiring Levophed  - discontinued Milrinone due to worsening MAKENNA - nephrology following  - start dobutamine 5mcg  - plan for Impella placement and transfer for higher level of care with advanced heart failure

## 2023-08-25 NOTE — PROGRESS NOTES
[facility]  Critical Care  Progress Note    Patient Name: Ora Sewell  MRN: [id number]  Admission Date: 8/23/2023  Hospital Length of Stay: 2 days  Code Status: Full Code  Attending Provider: Jacy Muro MD  Primary Care Provider: Asad Mendez IV, DO   Principal Problem: Cardiogenic shock    Subjective:     Interval History: Denies any discomfort. Refractory cardiogenic shock requiring mechanical support s/p Impella 08/25; updated family on condition. Plateau pressures 18.      Objective:     Vital Signs (Most Recent):  Temp: 97.9 °F (36.6 °C) (08/25/23 1355)  Pulse: (!) 157 (08/25/23 1355)  Resp: (!) 27 (08/25/23 1355)  BP: (!) 116/35 (08/25/23 1355)  SpO2: 97 % (08/25/23 1355) Vital Signs (24h Range):  Temp:  [93.9 °F (34.4 °C)-97.9 °F (36.6 °C)] 97.9 °F (36.6 °C)  Pulse:  [] 157  Resp:  [12-37] 27  SpO2:  [84 %-98 %] 97 %  BP: ()/() 116/35     Weight: 104.3 kg (230 lb)  Body mass index is 37.12 kg/m².      Intake/Output Summary (Last 24 hours) at 8/25/2023 1648  Last data filed at 8/25/2023 1400  Gross per 24 hour   Intake 2373.16 ml   Output 50 ml   Net 2323.16 ml        Physical Exam  Constitutional:       Appearance: She is obese. She is ill-appearing.   HENT:      Head: Normocephalic and atraumatic.      Mouth/Throat:      Mouth: Mucous membranes are dry.      Comments: ETT secured  Eyes:      General: No scleral icterus.     Conjunctiva/sclera: Conjunctivae normal.   Cardiovascular:      Rate and Rhythm: Normal rate. Rhythm irregular.      Heart sounds: Murmur heard.      No friction rub. No gallop.   Pulmonary:      Breath sounds: No wheezing or rhonchi.      Comments: Mechanical breath sounds, synchronous with vent.  Abdominal:      Palpations: Abdomen is soft.      Tenderness: There is no abdominal tenderness. There is no guarding.   Musculoskeletal:      Right lower leg: Edema present.      Left lower leg: Edema present.   Skin:     Findings: No rash.       Comments: cool   Neurological:      Comments: Opens eyes to name, communicating with head gestures, moving all limbs spontaneously           Review of Systems    Vents:  Vent Mode: A/C (08/25/23 1124)  Set Rate: 21 BPM (08/25/23 1124)  Vt Set: 450 mL (08/25/23 1124)  PEEP/CPAP: 5 cmH20 (08/25/23 1124)  Oxygen Concentration (%): 50 (08/25/23 1124)  Peak Airway Pressure: 18 cmH20 (08/25/23 1124)  Total Ve: 11.2 L/m (08/25/23 1124)  F/VT Ratio<105 (RSBI): (!) 52.5 (08/25/23 0501)    Lines/Drains/Airways       Central Venous Catheter Line  Duration             Percutaneous Central Line Insertion/Assessment - Triple Lumen  08/25/23 1215 Internal Jugular Left <1 day              Drain  Duration                  NG/OG Tube 08/25/23 0000 Center mouth <1 day         Urethral Catheter 08/24/23 2350 <1 day              Airway  Duration                  Airway - Non-Surgical 08/24/23 2349 Endotracheal Tube <1 day              Peripheral Intravenous Line  Duration                  Peripheral IV - Single Lumen 08/24/23 0034 22 G Distal;Left;Posterior Forearm 1 day         Peripheral IV - Single Lumen 08/24/23 2322 22 G Anterior;Right Shoulder <1 day         Peripheral IV - Single Lumen 08/24/23 2333 20 G Anterior;Left Forearm <1 day                    Significant Labs:    CBC/Anemia Profile:  Recent Labs   Lab 08/24/23  0648 08/25/23  0443   WBC 20.17* 39.11*   HGB 13.3 12.2   HCT 42.2 40.0    215   MCV 80.5 84.4   RDW 17.5* 17.0*        Chemistries:  Recent Labs   Lab 08/24/23  0648 08/25/23  0443 08/25/23  0511 08/25/23  1050   * 128* 126*  --    K 4.4 4.4 4.5  --    CL 97* 87* 89*  --    CO2 25 17* 16*  --    BUN 31* 38* 38*  --    CREATININE 1.87* 2.86* 2.91*  --    CALCIUM 8.6 7.3* 7.3*  --    ALBUMIN  --   --  2.6* 2.7*   PROT  --   --  5.8* 5.5*   BILITOT  --   --  4.5* 3.9*   ALKPHOS  --   --  127 123   ALT  --   --  3,302* 4,233*   AST  --   --  8,737* 14,751*   MG 2.3  --  2.6*  --        Lactic Acid:    Recent Labs   Lab 08/25/23  1050 08/25/23  1311 08/25/23  1427   LACTATE 8.0* 7.7* 8.7*     All labs reviewed.     Significant Imaging:  I have reviewed all pertinent imaging results/findings within the past 24 hours.    Assessment/Plan:     Neuro  Encephalopathy, metabolic  Multifactorial. Cardiogenic shock and now with renal and hepatic dysfunction. Intubated. S/p pLVAD. Plan to sedate for goal RASS -1 to 0 given current lines and critical illness.   - start propofol gtt  - cont fentanyl gtt  - cardiogenic shock management as below  - will assess in am for sedation holiday candidacy    Pulmonary  Acute respiratory failure with hypoxia  Acute respiratory failure with hypoxia attributed to ongoing shock. CXR with no acute process. Intubated 08/24 overnight. Plateau pressure 16.  Vent Mode: A/C  Oxygen Concentration (%):  [50-80] 50  Resp Rate Total:  [16 br/min-28 br/min] 28 br/min  Vt Set:  [400 mL-450 mL] 450 mL  PEEP/CPAP:  [5 cmH20] 5 cmH20  Mean Airway Pressure:  [9 [id number] cmH20] 9 cmH20  - ABG Qam and PRN  - plan to hyperventilate given ongoing acidemia    Cardiac/Vascular  * Cardiogenic shock  CAD s/p PCI p/w abdominal pain and poor PO tolerance. Tn flat [place] BNP at 29[address]. TTE with LVEF 15-20%. S/p CorAngio notable for elevated LVEDP with CI 1.4. Initially managed with diuresis for concern of R-L discordance. Course with progression of hypotension into shock with uptrending LA. Initiated on inotropic support with persistent increase in LA; concern for concomitant dynamic LVOT obstruction. Degree of shock warranted mechanical LV support. Cardiology on board and appreciate their recommendations and management. Now s/p pLVAD with Impella. Plan for management as follows:  -   preload: received IVF during procedure; will administer gentle fluid hydration, appreciate Cardiology recs on CVP goal  - rhythm: AFib on amiodarone gtt and digoxine  - inotropy: s/p Impella P6, bedside POCUS on transfer for baseline  device positioning, LA Q4H, ScvO2 Q4H to start. Will continue dobutamine at 2.5 mcg/kg/min  - afterload: n/a  - holding on systemic anticoagulation given INR elevation; this may be solely reflective of ongoing hepatic dysfunction. Unable to have heparin solely though line. Will follow serial INR and clinical evaluation for bleeding diathesis and consider systemic heparin gtt  - started on stress dose steroids for differential of adrenal insufficiency/crisis; will continue this, cortisol pending; additional indication includes septic shock  - Cardiology on board, appreciate recommendations      Atrial fibrillation with rapid ventricular response  On amiodarone gtt. Bolus 150 mg IV x1  - cont amiodarone gtt  - see shock plan for A/c discussion    Renal/  MAKENNA (acute kidney injury)  2/2 cardiogenic shock. Hypovolemic on evaluation. Nephrology on board and appreciate recommendations.   - received 1.5L IVF during impella insertion   - admin additional 500cc now  - strict Is/Os    ID  Severe sepsis  Aspiration event. Concern for multifactorial shock.   - cont Linezolid IV and Zosyn IV  - BCx, tracheal Cxs, urine Cxs sent, f/u  - f/u MRSA swab    Hematology  Coagulopathy  INR elevated in patient with shock liver. Serial INR measurement. Will reassess serially for anticoagulation candidacy in this patient with Afib and now with pLVAD.    GI  Shock liver  2/2 cardiogenic shock.  - daily LFTs    Intractable vomiting with nausea  RESOLVED.   - SUP ppx with PPI  - Nutrition recommendations appreciated.   - start trickle feeds      I have personally spent 90 minutes of critical care time, exclusive of time spent on any procedures, in evaluation and management of this critical ill patient's condition of cardiogenic shock requiring mechanical support, sepsis, respiratory failure. Coordinating for transfer to higher level of care.    Discussed with family management plans, all questions were answered and they verbalized  understanding.            Jacy Muro MD  Pulmonology and Critical Care  [facility]

## 2023-08-25 NOTE — SUBJECTIVE & OBJECTIVE
Interval History:       Review of Systems   Constitutional:  Positive for fatigue. Negative for appetite change and fever.   HENT:  Negative for congestion, hearing loss and trouble swallowing.    Respiratory:  Negative for chest tightness, shortness of breath and wheezing.    Cardiovascular:  Negative for chest pain and palpitations.   Gastrointestinal:  Positive for abdominal pain, nausea and vomiting. Negative for constipation.   Genitourinary:  Negative for difficulty urinating and dysuria.   Musculoskeletal:  Negative for back pain and neck stiffness.   Skin:  Negative for pallor and rash.   Neurological:  Negative for dizziness, speech difficulty and headaches.   Psychiatric/Behavioral:  Negative for confusion and suicidal ideas.      Objective:     Vital Signs (Most Recent):  Temp: 97.6 °F (36.4 °C) (08/24/23 1901)  Pulse: 90 (08/24/23 2342)  Resp: (!) 24 (08/24/23 2155)  BP: 104/84 (08/24/23 2014)  SpO2: (!) 89 % (08/24/23 2155) Vital Signs (24h Range):  Temp:  [97.2 °F (36.2 °C)-98.3 °F (36.8 °C)] 97.6 °F (36.4 °C)  Pulse:  [] 90  Resp:  [14-24] 24  SpO2:  [81 %-98 %] 89 %  BP: ()/() 104/84     Weight: 104.3 kg (230 lb)  Body mass index is 37.12 kg/m².  No intake or output data in the 24 hours ending 08/25/23 0009      Physical Exam  Vitals reviewed.   Constitutional:       General: She is awake. She is not in acute distress.     Appearance: She is well-developed. She is obese. She is not toxic-appearing.   HENT:      Head: Normocephalic.      Nose: Nose normal.      Mouth/Throat:      Pharynx: Oropharynx is clear.   Eyes:      Extraocular Movements: Extraocular movements intact.      Pupils: Pupils are equal, round, and reactive to light.   Neck:      Thyroid: No thyroid mass.      Vascular: No carotid bruit.   Cardiovascular:      Rate and Rhythm: Normal rate and regular rhythm.      Pulses: Normal pulses.      Heart sounds: Normal heart sounds. No murmur heard.  Pulmonary:      Effort:  Pulmonary effort is normal.      Breath sounds: Normal breath sounds and air entry. No wheezing.   Abdominal:      General: Bowel sounds are normal. There is no distension.      Palpations: Abdomen is soft.      Tenderness: There is abdominal tenderness in the right upper quadrant. There is no guarding or rebound.   Musculoskeletal:         General: Normal range of motion.      Cervical back: Neck supple. No rigidity.   Skin:     General: Skin is warm.      Coloration: Skin is not jaundiced.      Findings: No lesion.   Neurological:      General: No focal deficit present.      Mental Status: She is alert and oriented to person, place, and time.      Cranial Nerves: No cranial nerve deficit.   Psychiatric:         Attention and Perception: Attention normal.         Mood and Affect: Mood normal.         Behavior: Behavior normal. Behavior is cooperative.         Thought Content: Thought content normal.         Cognition and Memory: Cognition normal.             Significant Labs: All pertinent labs within the past 24 hours have been reviewed.  BMP:   Recent Labs   Lab 08/24/23  0648      *   K 4.4   CL 97*   CO2 25   BUN 31*   CREATININE 1.87*   CALCIUM 8.6   MG 2.3     CBC:   Recent Labs   Lab 08/23/23  1208 08/24/23  0648   WBC 19.50* 20.17*   HGB 14.4 13.3   HCT 46.5 42.2   * 379     CMP:   Recent Labs   Lab 08/23/23  1208 08/24/23  0648   * 134*   K 3.2* 4.4   CL 95* 97*   CO2 22 25   * 106   BUN 25* 31*   CREATININE 1.97* 1.87*   CALCIUM 9.1 8.6   PROT 7.0  --    ALBUMIN 3.2*  --    BILITOT 0.5  --    ALKPHOS 107  --    AST 30  --    ALT 16  --    ANIONGAP 20* 16       Significant Imaging: I have reviewed all pertinent imaging results/findings within the past 24 hours.

## 2023-08-25 NOTE — ASSESSMENT & PLAN NOTE
2/2 cardiogenic shock. Hypovolemic on evaluation. Nephrology on board and appreciate recommendations.   - received 1.5L IVF during impella insertion   - admin additional 500cc now  - strict Is/Os

## 2023-08-25 NOTE — SUBJECTIVE & OBJECTIVE
Interval History: Denies any discomfort. Refractory cardiogenic shock requiring mechanical support s/p Impella 08/25; updated family on condition. Plateau pressures 18.      Objective:     Vital Signs (Most Recent):  Temp: 97.9 °F (36.6 °C) (08/25/23 1355)  Pulse: (!) 157 (08/25/23 1355)  Resp: (!) 27 (08/25/23 1355)  BP: (!) 116/35 (08/25/23 1355)  SpO2: 97 % (08/25/23 1355) Vital Signs (24h Range):  Temp:  [93.9 °F (34.4 °C)-97.9 °F (36.6 °C)] 97.9 °F (36.6 °C)  Pulse:  [] 157  Resp:  [12-37] 27  SpO2:  [84 %-98 %] 97 %  BP: ()/() 116/35     Weight: 104.3 kg (230 lb)  Body mass index is 37.12 kg/m².      Intake/Output Summary (Last 24 hours) at 8/25/2023 1648  Last data filed at 8/25/2023 1400  Gross per 24 hour   Intake 2373.16 ml   Output 50 ml   Net 2323.16 ml        Physical Exam  Constitutional:       Appearance: She is obese. She is ill-appearing.   HENT:      Head: Normocephalic and atraumatic.      Mouth/Throat:      Mouth: Mucous membranes are dry.      Comments: ETT secured  Eyes:      General: No scleral icterus.     Conjunctiva/sclera: Conjunctivae normal.   Cardiovascular:      Rate and Rhythm: Normal rate. Rhythm irregular.      Heart sounds: Murmur heard.      No friction rub. No gallop.   Pulmonary:      Breath sounds: No wheezing or rhonchi.      Comments: Mechanical breath sounds, synchronous with vent.  Abdominal:      Palpations: Abdomen is soft.      Tenderness: There is no abdominal tenderness. There is no guarding.   Musculoskeletal:      Right lower leg: Edema present.      Left lower leg: Edema present.   Skin:     Findings: No rash.      Comments: cool   Neurological:      Comments: Opens eyes to name, communicating with head gestures, moving all limbs spontaneously           Review of Systems    Vents:  Vent Mode: A/C (08/25/23 1124)  Set Rate: 21 BPM (08/25/23 1124)  Vt Set: 450 mL (08/25/23 1124)  PEEP/CPAP: 5 cmH20 (08/25/23 1124)  Oxygen Concentration (%): 50  (08/25/23 1124)  Peak Airway Pressure: 18 cmH20 (08/25/23 1124)  Total Ve: 11.2 L/m (08/25/23 1124)  F/VT Ratio<105 (RSBI): (!) 52.5 (08/25/23 0501)    Lines/Drains/Airways       Central Venous Catheter Line  Duration             Percutaneous Central Line Insertion/Assessment - Triple Lumen  08/25/23 1215 Internal Jugular Left <1 day              Drain  Duration                  NG/OG Tube 08/25/23 0000 Center mouth <1 day         Urethral Catheter 08/24/23 2350 <1 day              Airway  Duration                  Airway - Non-Surgical 08/24/23 2349 Endotracheal Tube <1 day              Peripheral Intravenous Line  Duration                  Peripheral IV - Single Lumen 08/24/23 0034 22 G Distal;Left;Posterior Forearm 1 day         Peripheral IV - Single Lumen 08/24/23 2322 22 G Anterior;Right Shoulder <1 day         Peripheral IV - Single Lumen 08/24/23 2333 20 G Anterior;Left Forearm <1 day                    Significant Labs:    CBC/Anemia Profile:  Recent Labs   Lab 08/24/23  0648 08/25/23  0443   WBC 20.17* 39.11*   HGB 13.3 12.2   HCT 42.2 40.0    215   MCV 80.5 84.4   RDW 17.5* 17.0*        Chemistries:  Recent Labs   Lab 08/24/23  0648 08/25/23  0443 08/25/23  0511 08/25/23  1050   * 128* 126*  --    K 4.4 4.4 4.5  --    CL 97* 87* 89*  --    CO2 25 17* 16*  --    BUN 31* 38* 38*  --    CREATININE 1.87* 2.86* 2.91*  --    CALCIUM 8.6 7.3* 7.3*  --    ALBUMIN  --   --  2.6* 2.7*   PROT  --   --  5.8* 5.5*   BILITOT  --   --  4.5* 3.9*   ALKPHOS  --   --  127 123   ALT  --   --  3,302* 4,233*   AST  --   --  8,737* 14,751*   MG 2.3  --  2.6*  --        Lactic Acid:   Recent Labs   Lab 08/25/23  1050 08/25/23  1311 08/25/23  1427   LACTATE 8.0* 7.7* 8.7*     All labs reviewed.     Significant Imaging:  I have reviewed all pertinent imaging results/findings within the past 24 hours.

## 2023-08-25 NOTE — ASSESSMENT & PLAN NOTE
Acute respiratory failure with hypoxia attributed to ongoing shock. CXR with no acute process. Intubated 08/24 overnight. Plateau pressure 16.  Vent Mode: A/C  Oxygen Concentration (%):  [50-80] 50  Resp Rate Total:  [16 br/min-28 br/min] 28 br/min  Vt Set:  [400 mL-450 mL] 450 mL  PEEP/CPAP:  [5 cmH20] 5 cmH20  Mean Airway Pressure:  [9 pxD52-81 cmH20] 9 cmH20  - ABG Qam and PRN  - plan to hyperventilate given ongoing acidemia

## 2023-08-25 NOTE — PROCEDURES
"Ora Sewell is a 72 y.o. female patient.    Temp: 97.9 °F (36.6 °C) (08/25/23 1745)  Pulse: (!) 112 (08/25/23 1745)  Resp: (!) 24 (08/25/23 1745)  BP: (!) 111/49 (08/25/23 1745)  SpO2: (!) 94 % (08/25/23 1745)  Weight: 104.3 kg (230 lb) (08/23/23 2116)  Height: 5' 6" (167.6 cm) (08/23/23 2000)  Mallampati Scale:  (intubated)  ASA Classification: Class 3    Central Line    Date/Time: 8/25/2023 5:55 PM    Performed by: Lola Candelario AG-ACNP  Authorized by: Lola Candelario AG-ACNP    Location procedure was performed:  [facility], [place] PULMONARY/CRITICAL CARE  Pre-operative diagnosis:  Cardiogenic shock  Post-operative diagnosis:  Cardiogenic shock  Consent Done ?:  Yes  Indications:  Med administration, hemodialysis, vascular access and hemodynamic monitoring  Anesthesia:  Local infiltration  Local anesthetic:  Lidocaine 1% without epinephrine  Preparation:  Skin prepped with ChloraPrep  Location:  Left internal jugular  Catheter size:  7 Fr  Ultrasound guidance: Yes    Vessel Caliber:  Medium   patent  Needle advanced into vessel with real time ultrasound guidance.    Guidewire confirmed in vessel.    Image recorded and saved.    Steril sheath on probe.    Sterile gel used.  Manometry: No    Number of attempts:  1  Securement:  Line sutured, chlorhexidine patch, sterile dressing applied and blood return through all ports  Complications: No    Specimens: No    Implants: No    XRay:  Placement verified by x-ray and no pneumothorax on x-ray  Adverse Events:  None      8/25/2023    "

## 2023-08-25 NOTE — ASSESSMENT & PLAN NOTE
- EF 15-20% per echo, low output with fluid overload  - discontinue Toprol XL, nifedipine, IV cardizem and IV fluids  - start IV amiodarone  - AC with weight based lovenox for now  - tele monitoring

## 2023-08-25 NOTE — ASSESSMENT & PLAN NOTE
- single vessel nonobstructive CAD (70% mid R-PDA, LAD and LCx have separate ostium)  - continue ASA   - no statin at this time due to elevated liver enzymes 2/2 cardiogenic shock

## 2023-08-25 NOTE — ASSESSMENT & PLAN NOTE
- Dr. Brothers has seen and evaluated this pt  - troponins initially flat at 700 x 2, then decreased to 176  - echo shows acute systolic heart failure/low output with EF 15-20%   - hx of CAD, pt reports stent placement 5-10 years ago in [place]  - attempting to obtain records  - right and left heart cath today, keep NPO  - further recommendations to follow

## 2023-08-25 NOTE — ASSESSMENT & PLAN NOTE
- remains afib with RVR on IV amiodarone  - hx chronic afib  - no BB due to low output  - weight based lovenox renal dose for AC  - pt is critically ill, in cardiogenic shock  - risk of XIOMY for DCCV outweigh benefits

## 2023-08-25 NOTE — NURSING
Patient returned from cath lab at 1647, educated patient that she needs to lay flat supine for 4 hrs post cath to prevent bleeding at right groin access. Patient having SOB, 80% 2L nc. Respiratory therapist called patient placed on 15L winston mask. 02 sat 95% patient resting in bed.   1934 Amiodarone bolus administered to patient. Patient resting in bed, restless, unable to remain in supine position, patient sitting up intermittently and bending right leg. Reinforced education with patient multiple times of patient need to remain in supine position. Dr. Stout notified of patient restless, medication ordered and administered by Chana ESTRELLA. Abi placed on patient to allow her to void while in supine position. Right groin access soft, warm, dressing clean, dry and intact.

## 2023-08-25 NOTE — ASSESSMENT & PLAN NOTE
- EF 15-20%, dilated IVC  - new diagnosis per pt  - attempting to get records from [facility]  - right and left heart cath today, keep NPO

## 2023-08-26 VITALS
TEMPERATURE: 98 F | RESPIRATION RATE: 15 BRPM | SYSTOLIC BLOOD PRESSURE: 91 MMHG | HEIGHT: 66 IN | DIASTOLIC BLOOD PRESSURE: 57 MMHG | WEIGHT: 231 LBS | BODY MASS INDEX: 37.12 KG/M2 | OXYGEN SATURATION: 96 % | HEART RATE: 110 BPM

## 2023-08-26 PROBLEM — E87.1 HYPONATREMIA: Status: ACTIVE | Noted: 2023-08-26

## 2023-08-26 PROBLEM — E83.51 HYPOCALCEMIA: Status: ACTIVE | Noted: 2023-08-26

## 2023-08-26 LAB
ALBUMIN SERPL BCP-MCNC: 2.4 G/DL (ref 3.5–5)
ALP SERPL-CCNC: 124 U/L (ref 55–142)
ALT SERPL W P-5'-P-CCNC: 4260 U/L (ref 13–56)
ANION GAP SERPL CALCULATED.3IONS-SCNC: 23 MMOL/L (ref 7–16)
ANISOCYTOSIS BLD QL SMEAR: ABNORMAL
AST SERPL W P-5'-P-CCNC: ABNORMAL U/L (ref 15–37)
BASOPHILS # BLD AUTO: 0.13 K/UL (ref 0–0.2)
BASOPHILS NFR BLD AUTO: 0.4 % (ref 0–1)
BILIRUB DIRECT SERPL-MCNC: 3.7 MG/DL (ref 0–0.2)
BILIRUB SERPL-MCNC: 5.5 MG/DL (ref ?–1.2)
BUN SERPL-MCNC: 52 MG/DL (ref 7–18)
BUN/CREAT SERPL: 12 (ref 6–20)
CALCIUM SERPL-MCNC: 5.8 MG/DL (ref 8.5–10.1)
CHLORIDE SERPL-SCNC: 85 MMOL/L (ref 98–107)
CO2 SERPL-SCNC: 22 MMOL/L (ref 21–32)
CREAT SERPL-MCNC: 4.19 MG/DL (ref 0.55–1.02)
CRENATED CELLS: ABNORMAL
DIFFERENTIAL METHOD BLD: ABNORMAL
EGFR (NO RACE VARIABLE) (RUSH/TITUS): 11 ML/MIN/1.73M2
EOSINOPHIL # BLD AUTO: 0 K/UL (ref 0–0.5)
EOSINOPHIL NFR BLD AUTO: 0 % (ref 1–4)
ERYTHROCYTE [DISTWIDTH] IN BLOOD BY AUTOMATED COUNT: 16.6 % (ref 11.5–14.5)
GLUCOSE SERPL-MCNC: 291 MG/DL (ref 74–106)
HAV IGM SER QL: NORMAL
HBV CORE IGM SER QL: NORMAL
HBV SURFACE AG SERPL QL IA: NORMAL
HCO3 UR-SCNC: 22.7 MMOL/L (ref 21–28)
HCT VFR BLD AUTO: 32.7 % (ref 38–47)
HCV AB SER QL: NORMAL
HGB BLD-MCNC: 10.6 G/DL (ref 12–16)
IMM GRANULOCYTES # BLD AUTO: 0.68 K/UL (ref 0–0.04)
IMM GRANULOCYTES NFR BLD: 2.2 % (ref 0–0.4)
INR BLD: 2.85
LACTATE SERPL-SCNC: 2.8 MMOL/L (ref 0.4–2)
LACTATE SERPL-SCNC: 3.4 MMOL/L (ref 0.4–2)
LACTATE SERPL-SCNC: 3.9 MMOL/L (ref 0.4–2)
LYMPHOCYTES # BLD AUTO: 0.61 K/UL (ref 1–4.8)
LYMPHOCYTES NFR BLD AUTO: 1.9 % (ref 27–41)
LYMPHOCYTES NFR BLD MANUAL: 2 % (ref 27–41)
MCH RBC QN AUTO: 26 PG (ref 27–31)
MCHC RBC AUTO-ENTMCNC: 32.4 G/DL (ref 32–36)
MCV RBC AUTO: 80.3 FL (ref 80–96)
METHICILLIN RESISTANT STAPHYLOCOCCUS AUREUS: NEGATIVE
MONOCYTES # BLD AUTO: 0.99 K/UL (ref 0–0.8)
MONOCYTES NFR BLD AUTO: 3.2 % (ref 2–6)
MONOCYTES NFR BLD MANUAL: 2 % (ref 2–6)
MPC BLD CALC-MCNC: 11.4 FL (ref 9.4–12.4)
NEUTROPHILS # BLD AUTO: 29 K/UL (ref 1.8–7.7)
NEUTROPHILS NFR BLD AUTO: 92.3 % (ref 53–65)
NEUTS BAND NFR BLD MANUAL: 4 % (ref 1–5)
NEUTS SEG NFR BLD MANUAL: 92 % (ref 50–62)
NRBC # BLD AUTO: 0.42 X10E3/UL
NRBC BLD MANUAL-RTO: 1 /100 WBC
NRBC, AUTO (.00): 1.3 %
OSMOLALITY SERPL: 294 MOSM/KG (ref 280–301)
OSMOLALITY UR: 299 MOSM/KG (ref 50–1400)
PCO2 BLDA: 43 MMHG (ref 35–48)
PH SMN: 7.33 [PH] (ref 7.35–7.45)
PLATELET # BLD AUTO: 175 K/UL (ref 150–400)
PLATELET MORPHOLOGY: NORMAL
PO2 BLDA: 90 MMHG (ref 83–108)
POC BASE EXCESS: -3.2 MMOL/L (ref -2–3)
POC SATURATED O2: 81.7 %
POC SATURATED O2: 96 % (ref 95–98)
POTASSIUM SERPL-SCNC: 3.6 MMOL/L (ref 3.5–5.1)
PROT SERPL-MCNC: 4.9 G/DL (ref 6.4–8.2)
PROTHROMBIN TIME: 29.3 SECONDS (ref 11.7–14.7)
RBC # BLD AUTO: 4.07 M/UL (ref 4.2–5.4)
SODIUM SERPL-SCNC: 126 MMOL/L (ref 136–145)
WBC # BLD AUTO: 31.41 K/UL (ref 4.5–11)

## 2023-08-26 PROCEDURE — 83935 ASSAY OF URINE OSMOLALITY: CPT | Performed by: NURSE PRACTITIONER

## 2023-08-26 PROCEDURE — 80100014 HC HEMODIALYSIS 1:1

## 2023-08-26 PROCEDURE — 99900035 HC TECH TIME PER 15 MIN (STAT)

## 2023-08-26 PROCEDURE — 99291 CRITICAL CARE FIRST HOUR: CPT | Mod: ,,, | Performed by: NURSE PRACTITIONER

## 2023-08-26 PROCEDURE — 94003 VENT MGMT INPAT SUBQ DAY: CPT

## 2023-08-26 PROCEDURE — 83605 ASSAY OF LACTIC ACID: CPT | Performed by: STUDENT IN AN ORGANIZED HEALTH CARE EDUCATION/TRAINING PROGRAM

## 2023-08-26 PROCEDURE — 94761 N-INVAS EAR/PLS OXIMETRY MLT: CPT

## 2023-08-26 PROCEDURE — 87641 MR-STAPH DNA AMP PROBE: CPT | Performed by: STUDENT IN AN ORGANIZED HEALTH CARE EDUCATION/TRAINING PROGRAM

## 2023-08-26 PROCEDURE — 36556 INSERT NON-TUNNEL CV CATH: CPT | Mod: RT,,, | Performed by: SURGERY

## 2023-08-26 PROCEDURE — 84300 ASSAY OF URINE SODIUM: CPT | Performed by: NURSE PRACTITIONER

## 2023-08-26 PROCEDURE — 25000003 PHARM REV CODE 250: Performed by: NURSE PRACTITIONER

## 2023-08-26 PROCEDURE — 82803 BLOOD GASES ANY COMBINATION: CPT

## 2023-08-26 PROCEDURE — 63600175 PHARM REV CODE 636 W HCPCS: Performed by: NURSE PRACTITIONER

## 2023-08-26 PROCEDURE — C9113 INJ PANTOPRAZOLE SODIUM, VIA: HCPCS | Performed by: HOSPITALIST

## 2023-08-26 PROCEDURE — 87205 SMEAR GRAM STAIN: CPT | Performed by: STUDENT IN AN ORGANIZED HEALTH CARE EDUCATION/TRAINING PROGRAM

## 2023-08-26 PROCEDURE — 36556 PR INSERT NON-TUNNEL CV CATH 5+ YRS OLD: ICD-10-PCS | Mod: RT,,, | Performed by: SURGERY

## 2023-08-26 PROCEDURE — 63600175 PHARM REV CODE 636 W HCPCS: Performed by: STUDENT IN AN ORGANIZED HEALTH CARE EDUCATION/TRAINING PROGRAM

## 2023-08-26 PROCEDURE — 99291 PR CRITICAL CARE, E/M 30-74 MINUTES: ICD-10-PCS | Mod: ,,, | Performed by: NURSE PRACTITIONER

## 2023-08-26 PROCEDURE — 63600175 PHARM REV CODE 636 W HCPCS: Performed by: INTERNAL MEDICINE

## 2023-08-26 PROCEDURE — 25000003 PHARM REV CODE 250: Performed by: SURGERY

## 2023-08-26 PROCEDURE — 27200966 HC CLOSED SUCTION SYSTEM

## 2023-08-26 PROCEDURE — 87070 CULTURE OTHR SPECIMN AEROBIC: CPT | Performed by: STUDENT IN AN ORGANIZED HEALTH CARE EDUCATION/TRAINING PROGRAM

## 2023-08-26 PROCEDURE — 80048 BASIC METABOLIC PNL TOTAL CA: CPT | Performed by: GENERAL PRACTICE

## 2023-08-26 PROCEDURE — 83605 ASSAY OF LACTIC ACID: CPT | Performed by: NURSE PRACTITIONER

## 2023-08-26 PROCEDURE — 99233 SBSQ HOSP IP/OBS HIGH 50: CPT | Mod: ,,, | Performed by: NURSE PRACTITIONER

## 2023-08-26 PROCEDURE — 99233 PR SUBSEQUENT HOSPITAL CARE,LEVL III: ICD-10-PCS | Mod: ,,, | Performed by: NURSE PRACTITIONER

## 2023-08-26 PROCEDURE — 25000003 PHARM REV CODE 250: Performed by: GENERAL PRACTICE

## 2023-08-26 PROCEDURE — 27000221 HC OXYGEN, UP TO 24 HOURS

## 2023-08-26 PROCEDURE — 85610 PROTHROMBIN TIME: CPT | Performed by: STUDENT IN AN ORGANIZED HEALTH CARE EDUCATION/TRAINING PROGRAM

## 2023-08-26 PROCEDURE — 99223 PR INITIAL HOSPITAL CARE,LEVL III: ICD-10-PCS | Mod: 25,,, | Performed by: SURGERY

## 2023-08-26 PROCEDURE — 99900026 HC AIRWAY MAINTENANCE (STAT)

## 2023-08-26 PROCEDURE — 80074 ACUTE HEPATITIS PANEL: CPT | Performed by: INTERNAL MEDICINE

## 2023-08-26 PROCEDURE — 83930 ASSAY OF BLOOD OSMOLALITY: CPT | Performed by: NURSE PRACTITIONER

## 2023-08-26 PROCEDURE — 80076 HEPATIC FUNCTION PANEL: CPT | Performed by: STUDENT IN AN ORGANIZED HEALTH CARE EDUCATION/TRAINING PROGRAM

## 2023-08-26 PROCEDURE — C1752 CATH,HEMODIALYSIS,SHORT-TERM: HCPCS

## 2023-08-26 PROCEDURE — 85025 COMPLETE CBC W/AUTO DIFF WBC: CPT | Performed by: GENERAL PRACTICE

## 2023-08-26 PROCEDURE — 99223 1ST HOSP IP/OBS HIGH 75: CPT | Mod: 25,,, | Performed by: SURGERY

## 2023-08-26 PROCEDURE — 25000003 PHARM REV CODE 250: Performed by: HOSPITALIST

## 2023-08-26 PROCEDURE — 82570 ASSAY OF URINE CREATININE: CPT | Performed by: NURSE PRACTITIONER

## 2023-08-26 PROCEDURE — 63600175 PHARM REV CODE 636 W HCPCS: Performed by: HOSPITALIST

## 2023-08-26 PROCEDURE — 25000003 PHARM REV CODE 250: Performed by: STUDENT IN AN ORGANIZED HEALTH CARE EDUCATION/TRAINING PROGRAM

## 2023-08-26 RX ORDER — LIDOCAINE HYDROCHLORIDE 10 MG/ML
1 INJECTION INFILTRATION; PERINEURAL ONCE
Status: COMPLETED | OUTPATIENT
Start: 2023-08-26 | End: 2023-08-26

## 2023-08-26 RX ORDER — CALCIUM GLUCONATE 20 MG/ML
1 INJECTION, SOLUTION INTRAVENOUS
Status: COMPLETED | OUTPATIENT
Start: 2023-08-26 | End: 2023-08-26

## 2023-08-26 RX ORDER — SODIUM CHLORIDE 9 MG/ML
INJECTION, SOLUTION INTRAVENOUS CONTINUOUS
Status: DISCONTINUED | OUTPATIENT
Start: 2023-08-26 | End: 2023-08-26 | Stop reason: HOSPADM

## 2023-08-26 RX ORDER — SODIUM CHLORIDE 9 MG/ML
INJECTION, SOLUTION INTRAVENOUS
Status: DISCONTINUED | OUTPATIENT
Start: 2023-08-26 | End: 2023-08-26 | Stop reason: HOSPADM

## 2023-08-26 RX ORDER — CALCIUM GLUCONATE 20 MG/ML
1 INJECTION, SOLUTION INTRAVENOUS ONCE
Status: COMPLETED | OUTPATIENT
Start: 2023-08-26 | End: 2023-08-26

## 2023-08-26 RX ORDER — HEPARIN SODIUM 1000 [USP'U]/ML
4000 INJECTION, SOLUTION INTRAVENOUS; SUBCUTANEOUS
Status: DISCONTINUED | OUTPATIENT
Start: 2023-08-26 | End: 2023-08-26 | Stop reason: HOSPADM

## 2023-08-26 RX ORDER — MAGNESIUM SULFATE HEPTAHYDRATE 40 MG/ML
2 INJECTION, SOLUTION INTRAVENOUS ONCE
Status: COMPLETED | OUTPATIENT
Start: 2023-08-26 | End: 2023-08-26

## 2023-08-26 RX ORDER — SODIUM CHLORIDE 9 MG/ML
INJECTION, SOLUTION INTRAVENOUS
Status: DISCONTINUED
Start: 2023-08-26 | End: 2023-08-26 | Stop reason: HOSPADM

## 2023-08-26 RX ADMIN — PROPOFOL 15 MCG/KG/MIN: 10 INJECTION, EMULSION INTRAVENOUS at 08:08

## 2023-08-26 RX ADMIN — HEPARIN SODIUM 4000 UNITS: 1000 INJECTION, SOLUTION INTRAVENOUS; SUBCUTANEOUS at 03:08

## 2023-08-26 RX ADMIN — METHYLPREDNISOLONE SODIUM SUCCINATE 60 MG: 125 INJECTION INTRAMUSCULAR; INTRAVENOUS at 05:08

## 2023-08-26 RX ADMIN — SODIUM CHLORIDE: 9 INJECTION, SOLUTION INTRAVENOUS at 01:08

## 2023-08-26 RX ADMIN — CALCIUM GLUCONATE 1 G: 20 INJECTION, SOLUTION INTRAVENOUS at 08:08

## 2023-08-26 RX ADMIN — MAGNESIUM SULFATE HEPTAHYDRATE 2 G: 40 INJECTION, SOLUTION INTRAVENOUS at 05:08

## 2023-08-26 RX ADMIN — NOREPINEPHRINE BITARTRATE 0.5 MCG/KG/MIN: 1 INJECTION, SOLUTION, CONCENTRATE INTRAVENOUS at 05:08

## 2023-08-26 RX ADMIN — METHYLPREDNISOLONE SODIUM SUCCINATE 60 MG: 125 INJECTION INTRAMUSCULAR; INTRAVENOUS at 11:08

## 2023-08-26 RX ADMIN — VENLAFAXINE HYDROCHLORIDE 150 MG: 75 CAPSULE, EXTENDED RELEASE ORAL at 08:08

## 2023-08-26 RX ADMIN — ASPIRIN 81 MG: 81 TABLET, COATED ORAL at 08:08

## 2023-08-26 RX ADMIN — LINEZOLID 600 MG: 600 INJECTION, SOLUTION INTRAVENOUS at 10:08

## 2023-08-26 RX ADMIN — ENOXAPARIN SODIUM 105 MG: 150 INJECTION SUBCUTANEOUS at 08:08

## 2023-08-26 RX ADMIN — PIPERACILLIN AND TAZOBACTAM 4.5 G: 4; .5 INJECTION, POWDER, FOR SOLUTION INTRAVENOUS; PARENTERAL at 01:08

## 2023-08-26 RX ADMIN — PANTOPRAZOLE SODIUM 40 MG: 40 INJECTION, POWDER, FOR SOLUTION INTRAVENOUS at 08:08

## 2023-08-26 RX ADMIN — CALCIUM GLUCONATE 1 G: 20 INJECTION, SOLUTION INTRAVENOUS at 11:08

## 2023-08-26 RX ADMIN — AMIODARONE HYDROCHLORIDE 0.5 MG/MIN: 1.8 INJECTION, SOLUTION INTRAVENOUS at 10:08

## 2023-08-26 RX ADMIN — MUPIROCIN: 20 OINTMENT TOPICAL at 08:08

## 2023-08-26 RX ADMIN — PIPERACILLIN AND TAZOBACTAM 4.5 G: 4; .5 INJECTION, POWDER, FOR SOLUTION INTRAVENOUS; PARENTERAL at 06:08

## 2023-08-26 RX ADMIN — AMIODARONE HYDROCHLORIDE 0.5 MG/MIN: 1.8 INJECTION, SOLUTION INTRAVENOUS at 06:08

## 2023-08-26 RX ADMIN — NOREPINEPHRINE BITARTRATE 0.34 MCG/KG/MIN: 1 INJECTION, SOLUTION, CONCENTRATE INTRAVENOUS at 11:08

## 2023-08-26 RX ADMIN — METHYLPREDNISOLONE SODIUM SUCCINATE 60 MG: 125 INJECTION INTRAMUSCULAR; INTRAVENOUS at 06:08

## 2023-08-26 RX ADMIN — FENTANYL CITRATE 112.5 MCG/HR: 50 INJECTION, SOLUTION INTRAMUSCULAR; INTRAVENOUS at 09:08

## 2023-08-26 RX ADMIN — LIDOCAINE HYDROCHLORIDE 1 ML: 10 INJECTION, SOLUTION INFILTRATION; PERINEURAL at 01:08

## 2023-08-26 RX ADMIN — PROPOFOL 20 MCG/KG/MIN: 10 INJECTION, EMULSION INTRAVENOUS at 07:08

## 2023-08-26 NOTE — ASSESSMENT & PLAN NOTE
Aspiration event. Concern for multifactorial shock.   - cont Linezolid IV and Zosyn IV  - BCx, tracheal Cxs, urine Cxs sent, f/u  - f/u MRSA swab    8/26 - MRSA swab negative - dc zyvox

## 2023-08-26 NOTE — SUBJECTIVE & OBJECTIVE
Interval History: intubated, sedated, impella in place. On levo (weaning down)  and dobutamine. Accepted to [facility] but no bed available at this time.       Objective:     Vital Signs (Most Recent):  Temp: 98.2 °F (36.8 °C) (08/26/23 1000)  Pulse: (!) 125 (08/26/23 1000)  Resp: (!) 21 (08/26/23 1017)  BP: (!) 118/37 (08/26/23 1000)  SpO2: 96 % (08/26/23 1000) Vital Signs (24h Range):  Temp:  [97.3 °F (36.3 °C)-98.8 °F (37.1 °C)] 98.2 °F (36.8 °C)  Pulse:  [] 125  Resp:  [17-31] 21  SpO2:  [87 %-98 %] 96 %  BP: ()/(25-99) 118/37     Weight: 104.8 kg (231 lb)  Body mass index is 37.28 kg/m².      Intake/Output Summary (Last 24 hours) at 8/26/2023 1054  Last data filed at 8/26/2023 1000  Gross per 24 hour   Intake 4444.65 ml   Output 240 ml   Net 4204.65 ml        Physical Exam  Vitals reviewed.   Constitutional:       Interventions: She is sedated and intubated.   HENT:      Head: Normocephalic.      Right Ear: External ear normal.      Left Ear: External ear normal.      Nose: Nose normal.      Mouth/Throat:      Mouth: Mucous membranes are moist.   Eyes:      Extraocular Movements: Extraocular movements intact.      Conjunctiva/sclera: Conjunctivae normal.   Cardiovascular:      Rate and Rhythm: Tachycardia present. Rhythm irregular.      Pulses: Normal pulses.      Heart sounds: Normal heart sounds.   Pulmonary:      Effort: Pulmonary effort is normal. She is intubated.      Breath sounds: Normal breath sounds.   Abdominal:      General: Bowel sounds are normal.      Palpations: Abdomen is soft.   Musculoskeletal:         General: Normal range of motion.      Cervical back: Normal range of motion.      Right lower leg: Edema present.      Left lower leg: Edema present.   Skin:     General: Skin is warm and dry.      Capillary Refill: Capillary refill takes less than 2 seconds.           Review of Systems    Vents:  Vent Mode: A/C (08/26/23 4175)  Set Rate: 21 BPM (08/26/23 0755)  Vt Set: 500 mL (08/26/23  0755)  PEEP/CPAP: 5 cmH20 (08/26/23 0755)  Oxygen Concentration (%): 50 (08/26/23 0755)  Peak Airway Pressure: 22 cmH20 (08/26/23 0755)  Total Ve: 9.2 L/m (08/26/23 0755)  F/VT Ratio<105 (RSBI): (!) 47.73 (08/26/23 0127)    Lines/Drains/Airways       Central Venous Catheter Line  Duration              Introducer Single Lumen Femoral Vein Right;Femoral Artery Right -- days     Introducer with Double Lumen 08/25/23 1600 Femoral Vein Right <1 day    Percutaneous Central Line Insertion/Assessment - Triple Lumen  08/25/23 1215 Internal Jugular Left <1 day              Drain  Duration                  NG/OG Tube 08/25/23 0000 Center mouth 1 day         Urethral Catheter 08/24/23 2350 1 day              Airway  Duration                  Airway - Non-Surgical 08/24/23 2349 Endotracheal Tube 1 day              Peripheral Intravenous Line  Duration                  Peripheral IV - Single Lumen 08/24/23 0034 22 G Distal;Left;Posterior Forearm 2 days         Peripheral IV - Single Lumen 08/24/23 2333 20 G Anterior;Left Forearm 1 day                    Significant Labs:    CBC/Anemia Profile:  Recent Labs   Lab 08/25/23  0443 08/26/23  0246   WBC 39.11* 31.41*   HGB 12.2 10.6*   HCT 40.0 32.7*    175   MCV 84.4 80.3   RDW 17.0* 16.6*        Chemistries:  Recent Labs   Lab 08/25/23  0511 08/25/23  1050 08/25/23  1427 08/26/23  0246   *  --  127* 126*   K 4.5  --  2.9* 3.6   CL 89*  --  85* 85*   CO2 16*  --  24 22   BUN 38*  --  49* 52*   CREATININE 2.91*  --  3.58* 4.19*   CALCIUM 7.3*  --  6.3* 5.8*   ALBUMIN 2.6* 2.7* 2.6* 2.4*   PROT 5.8* 5.5* 5.4* 4.9*   BILITOT 4.5* 3.9* 3.5* 5.5*   ALKPHOS 127 123 128 124   ALT 3,302* 4,233* 4,405* 4,260*   AST 8,737* 14,751* 15,128* 15,903*   MG 2.6*  --   --   --        All pertinent labs within the past 24 hours have been reviewed.    Significant Imaging:  I have reviewed all pertinent imaging results/findings within the past 24 hours.
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
## 2023-08-26 NOTE — PLAN OF CARE
Problem: Adult Inpatient Plan of Care  Goal: Plan of Care Review  Outcome: Ongoing, Progressing  Goal: Patient-Specific Goal (Individualized)  Outcome: Ongoing, Progressing  Goal: Absence of Hospital-Acquired Illness or Injury  Outcome: Ongoing, Progressing  Intervention: Identify and Manage Fall Risk  Flowsheets (Taken 8/26/2023 0938)  Safety Promotion/Fall Prevention: assistive device/personal item within reach  Intervention: Prevent Skin Injury  Flowsheets (Taken 8/26/2023 0938)  Body Position: weight shifting  Skin Protection:   adhesive use limited   incontinence pads utilized   silicone foam dressing in place   skin-to-skin areas padded  Intervention: Prevent and Manage VTE (Venous Thromboembolism) Risk  Flowsheets (Taken 8/26/2023 0938)  Activity Management: Arm raise - L1  VTE Prevention/Management: remove, assess skin, and reapply sequential compression device  Range of Motion: active ROM (range of motion) encouraged  Intervention: Prevent Infection  Flowsheets (Taken 8/26/2023 0938)  Infection Prevention:   environmental surveillance performed   equipment surfaces disinfected   hand hygiene promoted   personal protective equipment utilized  Goal: Optimal Comfort and Wellbeing  Outcome: Ongoing, Progressing  Goal: Readiness for Transition of Care  Outcome: Ongoing, Progressing     Problem: Fluid and Electrolyte Imbalance (Acute Kidney Injury/Impairment)  Goal: Fluid and Electrolyte Balance  Outcome: Ongoing, Progressing     Problem: Oral Intake Inadequate (Acute Kidney Injury/Impairment)  Goal: Optimal Nutrition Intake  Outcome: Ongoing, Progressing  Intervention: Promote and Optimize Nutrition  Flowsheets (Taken 8/26/2023 0938)  Oral Nutrition Promotion: social interaction promoted     Problem: Renal Function Impairment (Acute Kidney Injury/Impairment)  Goal: Effective Renal Function  Outcome: Ongoing, Progressing  Intervention: Monitor and Support Renal Function  Flowsheets (Taken 8/26/2023  0938)  Stabilization Measures: airway opened  Medication Review/Management:   medications reviewed   pharmacy consulted   provider consulted     Problem: Fluid Imbalance (Pneumonia)  Goal: Fluid Balance  Outcome: Ongoing, Progressing  Intervention: Monitor and Manage Fluid Balance  Flowsheets (Taken 8/26/2023 0938)  Fluid/Electrolyte Management:   electrolyte-binding therapy initiated   fluids provided     Problem: Infection (Pneumonia)  Goal: Resolution of Infection Signs and Symptoms  Outcome: Ongoing, Progressing  Intervention: Prevent Infection Progression  Flowsheets (Taken 8/26/2023 0938)  Infection Management: aseptic technique maintained  Isolation Precautions: precautions maintained     Problem: Respiratory Compromise (Pneumonia)  Goal: Effective Oxygenation and Ventilation  Outcome: Ongoing, Progressing  Intervention: Optimize Oxygenation and Ventilation  Flowsheets (Taken 8/26/2023 0938)  Airway/Ventilation Management:   airway patency maintained   calming measures promoted     Problem: Gas Exchange Impaired  Goal: Optimal Gas Exchange  Outcome: Ongoing, Progressing     Problem: Infection  Goal: Absence of Infection Signs and Symptoms  Outcome: Ongoing, Progressing  Intervention: Prevent or Manage Infection  Flowsheets (Taken 8/26/2023 0938)  Infection Management: aseptic technique maintained  Isolation Precautions: precautions maintained     Problem: Skin Injury Risk Increased  Goal: Skin Health and Integrity  Outcome: Ongoing, Progressing     Problem: Communication Impairment (Mechanical Ventilation, Invasive)  Goal: Effective Communication  Outcome: Ongoing, Progressing     Problem: Device-Related Complication Risk (Mechanical Ventilation, Invasive)  Goal: Optimal Device Function  Outcome: Ongoing, Progressing     Problem: Inability to Wean (Mechanical Ventilation, Invasive)  Goal: Mechanical Ventilation Liberation  Outcome: Ongoing, Progressing     Problem: Nutrition Impairment (Mechanical  Ventilation, Invasive)  Goal: Optimal Nutrition Delivery  Outcome: Ongoing, Progressing     Problem: Skin and Tissue Injury (Mechanical Ventilation, Invasive)  Goal: Absence of Device-Related Skin and Tissue Injury  Outcome: Ongoing, Progressing     Problem: Ventilator-Induced Lung Injury (Mechanical Ventilation, Invasive)  Goal: Absence of Ventilator-Induced Lung Injury  Outcome: Ongoing, Progressing     Problem: Communication Impairment (Artificial Airway)  Goal: Effective Communication  Outcome: Ongoing, Progressing  Intervention: Ensure Effective Communication  Flowsheets (Taken 8/26/2023 4271)  Communication Enhancement Strategies:   communication board used   family involved in communication plan  Diversional Activities: television  Family/Support System Care:   involvement promoted   presence promoted   support provided  Trust Relationship/Rapport:   care explained   emotional support provided   empathic listening provided   questions answered   questions encouraged   reassurance provided   thoughts/feelings acknowledged  Direction of Care by Patient:   communicates direction of care effectively   focuses on important elements of care   identifies/directs elements of care     Problem: Device-Related Complication Risk (Artificial Airway)  Goal: Optimal Device Function  Outcome: Ongoing, Progressing     Problem: Skin and Tissue Injury (Artificial Airway)  Goal: Absence of Device-Related Skin or Tissue Injury  Outcome: Ongoing, Progressing     Problem: Noninvasive Ventilation Acute  Goal: Effective Unassisted Ventilation and Oxygenation  Outcome: Ongoing, Progressing     Problem: Fall Injury Risk  Goal: Absence of Fall and Fall-Related Injury  Outcome: Ongoing, Progressing     Problem: Restraint, Nonbehavioral (Nonviolent)  Goal: Absence of Harm or Injury  Outcome: Ongoing, Progressing     Problem: Adjustment to Illness (Sepsis/Septic Shock)  Goal: Optimal Coping  Outcome: Ongoing, Progressing     Problem:  Bleeding (Sepsis/Septic Shock)  Goal: Absence of Bleeding  Outcome: Ongoing, Progressing     Problem: Glycemic Control Impaired (Sepsis/Septic Shock)  Goal: Blood Glucose Level Within Desired Range  Outcome: Ongoing, Progressing     Problem: Infection Progression (Sepsis/Septic Shock)  Goal: Absence of Infection Signs and Symptoms  Outcome: Ongoing, Progressing     Problem: Nutrition Impaired (Sepsis/Septic Shock)  Goal: Optimal Nutrition Intake  Outcome: Ongoing, Progressing

## 2023-08-26 NOTE — PROGRESS NOTES
[facility]  Pulmonology  Progress Note    Patient Name: Ora Sewell  MRN: [id number]  Admission Date: 8/23/2023  Hospital Length of Stay: 3 days  Code Status: Full Code  Attending Provider: Jacy Muro MD  Primary Care Provider: Asad Mendez IV, DO   Principal Problem: Cardiogenic shock    Subjective:     Interval History: intubated, sedated, impella in place. On levo (weaning down)  and dobutamine. Accepted to [facility] but no bed available at this time.       Objective:     Vital Signs (Most Recent):  Temp: 98.2 °F (36.8 °C) (08/26/23 1000)  Pulse: (!) 125 (08/26/23 1000)  Resp: (!) 21 (08/26/23 1017)  BP: (!) 118/37 (08/26/23 1000)  SpO2: 96 % (08/26/23 1000) Vital Signs (24h Range):  Temp:  [97.3 °F (36.3 °C)-98.8 °F (37.1 °C)] 98.2 °F (36.8 °C)  Pulse:  [] 125  Resp:  [17-31] 21  SpO2:  [87 %-98 %] 96 %  BP: ()/(25-99) 118/37     Weight: 104.8 kg (231 lb)  Body mass index is 37.28 kg/m².      Intake/Output Summary (Last 24 hours) at 8/26/2023 1054  Last data filed at 8/26/2023 1000  Gross per 24 hour   Intake 4444.65 ml   Output 240 ml   Net 4204.65 ml        Physical Exam  Vitals reviewed.   Constitutional:       Interventions: She is sedated and intubated.   HENT:      Head: Normocephalic.      Right Ear: External ear normal.      Left Ear: External ear normal.      Nose: Nose normal.      Mouth/Throat:      Mouth: Mucous membranes are moist.   Eyes:      Extraocular Movements: Extraocular movements intact.      Conjunctiva/sclera: Conjunctivae normal.   Cardiovascular:      Rate and Rhythm: Tachycardia present. Rhythm irregular.      Pulses: Normal pulses.      Heart sounds: Normal heart sounds.   Pulmonary:      Effort: Pulmonary effort is normal. She is intubated.      Breath sounds: Normal breath sounds.   Abdominal:      General: Bowel sounds are normal.      Palpations: Abdomen is soft.   Musculoskeletal:         General: Normal range of motion.      Cervical back: Normal  range of motion.      Right lower leg: Edema present.      Left lower leg: Edema present.   Skin:     General: Skin is warm and dry.      Capillary Refill: Capillary refill takes less than 2 seconds.           Review of Systems    Vents:  Vent Mode: A/C (08/26/23 0755)  Set Rate: 21 BPM (08/26/23 0755)  Vt Set: 500 mL (08/26/23 0755)  PEEP/CPAP: 5 cmH20 (08/26/23 0755)  Oxygen Concentration (%): 50 (08/26/23 0755)  Peak Airway Pressure: 22 cmH20 (08/26/23 0755)  Total Ve: 9.2 L/m (08/26/23 0755)  F/VT Ratio<105 (RSBI): (!) 47.73 (08/26/23 0127)    Lines/Drains/Airways       Central Venous Catheter Line  Duration              Introducer Single Lumen Femoral Vein Right;Femoral Artery Right -- days     Introducer with Double Lumen 08/25/23 1600 Femoral Vein Right <1 day    Percutaneous Central Line Insertion/Assessment - Triple Lumen  08/25/23 1215 Internal Jugular Left <1 day              Drain  Duration                  NG/OG Tube 08/25/23 0000 Center mouth 1 day         Urethral Catheter 08/24/23 2350 1 day              Airway  Duration                  Airway - Non-Surgical 08/24/23 2349 Endotracheal Tube 1 day              Peripheral Intravenous Line  Duration                  Peripheral IV - Single Lumen 08/24/23 0034 22 G Distal;Left;Posterior Forearm 2 days         Peripheral IV - Single Lumen 08/24/23 2333 20 G Anterior;Left Forearm 1 day                    Significant Labs:    CBC/Anemia Profile:  Recent Labs   Lab 08/25/23  0443 08/26/23  0246   WBC 39.11* 31.41*   HGB 12.2 10.6*   HCT 40.0 32.7*    175   MCV 84.4 80.3   RDW 17.0* 16.6*        Chemistries:  Recent Labs   Lab 08/25/23  0511 08/25/23  1050 08/25/23  1427 08/26/23  0246   *  --  127* 126*   K 4.5  --  2.9* 3.6   CL 89*  --  85* 85*   CO2 16*  --  24 22   BUN 38*  --  49* 52*   CREATININE 2.91*  --  3.58* 4.19*   CALCIUM 7.3*  --  6.3* 5.8*   ALBUMIN 2.6* 2.7* 2.6* 2.4*   PROT 5.8* 5.5* 5.4* 4.9*   BILITOT 4.5* 3.9* 3.5* 5.5*    ALKPHOS 127 123 128 124   ALT 3,302* 4,233* 4,405* 4,260*   AST 8,737* 14,751* 15,128* 15,903*   MG 2.6*  --   --   --        All pertinent labs within the past 24 hours have been reviewed.    Significant Imaging:  I have reviewed all pertinent imaging results/findings within the past 24 hours.    Assessment/Plan:     Pulmonary  Acute respiratory failure with hypoxia  Acute respiratory failure with hypoxia attributed to ongoing shock. CXR with no acute process. Intubated 08/24 overnight. Plateau pressure 16.  Vent Mode: A/C  Oxygen Concentration (%):  [50] 50  Resp Rate Total:  [21 br/min-28 br/min] 21 br/min  Vt Set:  [450 mL-500 mL] 500 mL  PEEP/CPAP:  [5 cmH20] 5 cmH20  Mean Airway Pressure:  [8 cmH20-9 cmH20] 8 cmH20  - ABG Qam and PRN    8/26- ABG - 7.33/43/90 - driving pressure 12 - will increase to rate 22 - continue to rest on vent     Cardiac/Vascular  * Cardiogenic shock  CAD s/p PCI p/w abdominal pain and poor PO tolerance. [place] [address]. TTE with LVEF 15-20%. S/p CorAngio notable for elevated LVEDP with CI 1.4. Initially managed with diuresis for concern of R-L discordance. Course with progression of hypotension into shock with uptrending LA. Initiated on inotropic support with persistent increase in LA; concern for concomitant dynamic LVOT obstruction. Degree of shock warranted mechanical LV support. Cardiology on board and appreciate their recommendations and management. Now s/p pLVAD with Impella. Plan for management as follows:  -   preload: received IVF during procedure; will administer gentle fluid hydration, appreciate Cardiology recs on CVP goal  - rhythm: AFib on amiodarone gtt and digoxine  - inotropy: s/p Impella P6, bedside POCUS on transfer for baseline device positioning, LA Q4H, ScvO2 Q4H to start. Will continue dobutamine at 2.5 mcg/kg/min  - afterload: n/a  - holding on systemic anticoagulation given INR elevation; this may be solely reflective of ongoing hepatic  dysfunction. Unable to have heparin solely though line. Will follow serial INR and clinical evaluation for bleeding diathesis and consider systemic heparin gtt  - started on stress dose steroids for differential of adrenal insufficiency/crisis; will continue this, cortisol pending; additional indication includes septic shock  - Cardiology on board, appreciate recommendations    08/26-- continue Impella and inotropic support - [facility] has accepted the pateint; however, no beds available at this time.       Low cardiac output syndrome  Plan as above - cardiology following     Atrial fibrillation with rapid ventricular response  On amiodarone gtt. Bolus 150 mg IV x1  - cont amiodarone gtt  - see shock plan for A/c discussion    8/26- heart rate 110-120's     Renal/  Hypocalcemia  5.8-- replace IV     Hyponatremia    obtain urine electrolytes  One time dose of IV lasix     MAKENNA (acute kidney injury)  2/2 cardiogenic shock. Hypovolemic on evaluation. Nephrology on board and appreciate recommendations.   - received 1.5L IVF during impella insertion   - admin additional 500cc now  - strict Is/Os    8/26- discussed with cardiology - will check urine electrolytes and osmolality - elevated PA pressures; UOP 155cc overnight; will give one time dose of IV lasix and see how she responds.     ID  Severe sepsis  Aspiration event. Concern for multifactorial shock.   - cont Linezolid IV and Zosyn IV  - BCx, tracheal Cxs, urine Cxs sent, f/u  - f/u MRSA swab    8/26 - MRSA swab negative - dc zyvox     GI  Shock liver  2/2 cardiogenic shock.  - daily LFTs and iNR       This includes 50 minutes of critical care time spent evaluating and managing patient. This includes time spent at bedside, reviewing labs, data, xrays, collaborating with consultants  and monitoring for acute decompensation.            Lola Candelario, AG-ACNP  Pulmonology  [facility]

## 2023-08-26 NOTE — PROGRESS NOTES
Progress note    History of present illness:  Ora Sewell was admitted for shortness of breath and discovered to have a severe dilated nonischemic cardiomyopathy and acute on chronic renal failure.  Pressor drugs Milrinone and dobutamine were initiated and tandem with no improvement and, in fact clinical worsening into cardiogenic shock.  Yesterday the patient was taken to the heart catheterization lab and had an Impella device implanted.  Initially parameters suggested volume contraction.  The patient responded well to addition of volume and today was tolerating P 7.  Kidney function continued to decline, creatinine now over 4.0.  Urine output has been minimal.  [facility] has agreed to accept the patient and transfer.  I feel this is a positive for the patient as we have no other long-term solutions beyond short term with Impella.  She had an AV graft placed today for dialysis.    Impression:  Cardiogenic shock with acute on chronic renal failure    Plan:    1. Continue cardiovascular support with Impella.      2. Transferred to [facility] for advanced heart failure care.      Benito Izaguirre MD  [facility]

## 2023-08-26 NOTE — ASSESSMENT & PLAN NOTE
Acute respiratory failure with hypoxia attributed to ongoing shock. CXR with no acute process. Intubated 08/24 overnight. Plateau pressure 16.  Vent Mode: A/C  Oxygen Concentration (%):  [50] 50  Resp Rate Total:  [21 br/min-28 br/min] 21 br/min  Vt Set:  [450 mL-500 mL] 500 mL  PEEP/CPAP:  [5 cmH20] 5 cmH20  Mean Airway Pressure:  [8 cmH20-9 cmH20] 8 cmH20  - ABG Qam and PRN    8/26- ABG - 7.33/43/90 - driving pressure 12 - will increase to rate 22 - continue to rest on vent

## 2023-08-26 NOTE — ASSESSMENT & PLAN NOTE
2/2 cardiogenic shock. Hypovolemic on evaluation. Nephrology on board and appreciate recommendations.   - received 1.5L IVF during impella insertion   - admin additional 500cc now  - strict Is/Os    8/26- discussed with cardiology - will check urine electrolytes and osmolality - elevated PA pressures; UOP 155cc overnight; will give one time dose of IV lasix and see how she responds.

## 2023-08-26 NOTE — CONSULTS
[facility] ICU  Adult Nutrition  Consult Note         Reason for Assessment  Reason For Assessment: consult (trickle feeds)   Nutrition Risk Screen: no indicators present     Consult for trickle feeds received and appreciated.     Recommend Suplena @ 10ml/h with 30ml free water flush. Trickle feeds. Propofol provides 248kcals.     Patient awaiting transfer to another facility. RD following.     Per MD notes  Interval History: intubated, sedated, impella in place. On levo (weaning down)  and dobutamine. Accepted to [facility] but no bed available at this time.          Malnutrition  Will assess next RD visit if patient still in facility  Skin Integrity  Anup Risk Assessment  Sensory Perception: 3-->slightly limited  Moisture: 3-->occasionally moist  Activity: 1-->bedfast  Mobility: 2-->very limited  Nutrition: 2-->probably inadequate  Friction and Shear: 2-->potential problem  Anup Score: 13    Nutrition Diagnosis  Inadequate oral intake   related to recent illness as evidenced by patient on vent and sedated    Nutrition Diagnosis Status: Chronic/ continues        Nutrition Risk  Level of Risk/Frequency of Follow-up: high    Recent Labs   Lab 08/26/23  0246   *     Comments on Glucose: elevated due to stress response  Nutrition Prescription / Recommendations  Recommendation/Intervention: Recommend trickle feeds of Suplena @10ml/h with 30ml free water flush.  Goals: tolerance of trickle feeds.  Nutrition Goal Status: new  Communication of RD Recs: reviewed with physician  Current Diet Order: NPO  Chewing or Swallowing Difficulty?: Swallowing difficulty  Recommended Diet: Enteral Nutrition  Recommended Oral Supplement: No Oral Supplements  Is Nutrition Support Recommended: Yes   Needs Calculated  Energy Need Method: Kcal/kg Energy Calorie Requirements (kcal): 8787-6529  Protein Requirements: 42-56g    Enteral Nutrition Recommended Order:  Tube feeding via NG/ Dobhoff  Tube feeding formula:  Suplena 1.8 Continuous 10 ml/h  Free Water Flush: 30 ml hourly  Modular Supplements:No Modular Supplements needed  Enteral Nutrition meets needs?: no - trickle feeds  Enteral Nutrition Status: New Order    Is Education Recommended: No  Monitor and Evaluation  % current Intake: NPO  % intake to meet estimated needs: Enteral Nutrition   Food and Nutrient Intake: enteral nutrition intake  Food and Nutrient Adminstration: enteral and parenteral nutrition administration  Anthropometric Measurements: weight, weight change, body mass index  Biochemical Data, Medical Tests and Procedures: electrolyte and renal panel, glucose/endocrine profile, lipid profile, gastrointestinal profile, inflammatory profile  Nutrition-Focused Physical Findings: overall appearance, skin, extremities, muscles and bones, head and eyes     Current Medical Diagnosis and Past Medical History     Past Medical History:   Diagnosis Date    Asthma     childhood    Cancer     uterus    CHF (congestive heart failure)     Chronic atrial fibrillation     Coronary artery disease     Depression, recurrent 06/20/2023    Essential (primary) hypertension     Gastritis     Hyperlipidemia     Kidney stones     Marijuana dependence     On home O2     says placed on oxygen at night until she can get her sleep study    Primary osteoarthritis of right shoulder 08/17/2023    on chronic opioids    Sciatic nerve disease      Nutrition/Diet History  Food Allergies: NKFA  Factors Affecting Nutritional Intake: NPO, on mechanical ventilation  Lab/Procedures/Meds  Recent Labs   Lab 08/26/23  0246   *   K 3.6   BUN 52*   CREATININE 4.19*   CALCIUM 5.8*   ALBUMIN 2.4*   CL 85*   ALT 4,260*   AST 15,903*     Last A1c:   Lab Results   Component Value Date    HGBA1C 6.4 08/24/2023     Lab Results   Component Value Date    RBC 4.07 (L) 08/26/2023    HGB 10.6 (L) 08/26/2023    HCT 32.7 (L) 08/26/2023    MCV 80.3 08/26/2023    MCH 26.0 (L) 08/26/2023    MCHC 32.4  "08/26/2023    TIBC 505 (H) 03/06/2021     Pertinent Labs Reviewed: reviewed  Pertinent Labs Comments: Sodium: 126 (L)  Potassium: 3.6  Chloride: 85 (L)  CO2: 22  Anion Gap: 23 (H)  BUN: 52 (H)  Creatinine: 4.19 (H)  BUN/CREAT RATIO: 12  eGFR: 11 (L)  Glucose: 291 (H)  Calcium: 5.8 (LL)  Alkaline Phosphatase: 124  PROTEIN TOTAL: 4.9 (L)  Albumin: 2.4 (L)  BILIRUBIN TOTAL: 5.5 (H)      (LL): Data is critically low  (L): Data is abnormally low  (H): Data is abnormally high  Pertinent Medications Reviewed: reviewed  Anthropometrics  Temp: 97.9 °F (36.6 °C)  Height: 5' 6" (167.6 cm)  Height (inches): 66 in  Weight Method: Bed Scale  Weight: 104.8 kg (231 lb)  Weight (lb): 231 lb  Ideal Body Weight (IBW), Female: 130 lb  % Ideal Body Weight, Female (lb): 177.69 %  BMI (Calculated): 37.3  BMI Grade: 35 - 39.9 - obesity - grade II     Estimated/Assessed Needs    Total Ve: 9.2 L/m Temp: 97.9 °F (36.6 °C)Core Bladder  Weight Used For Calorie Calculations: 70.5 kg (155 lb 6.8 oz)   Energy Need Method: Kcal/kg Energy Calorie Requirements (kcal): 0288-1352  Weight Used For Protein Calculations: 70.5 kg (155 lb 6.8 oz)  Protein Requirements: 42-56g  Estimated Fluid Requirement Method: RDA Method    RDA Method (mL): 1762     Nutrition by Nursing  Diet/Nutrition Received: NPO           Last Bowel Movement: 08/23/23              Nutrition Follow-Up  RD Follow-up?: Yes    "

## 2023-08-26 NOTE — PLAN OF CARE
Problem: Adult Inpatient Plan of Care  Goal: Plan of Care Review  Outcome: Ongoing, Progressing  Goal: Patient-Specific Goal (Individualized)  Outcome: Ongoing, Progressing  Goal: Absence of Hospital-Acquired Illness or Injury  Outcome: Ongoing, Progressing  Goal: Optimal Comfort and Wellbeing  Outcome: Ongoing, Progressing  Goal: Readiness for Transition of Care  Outcome: Ongoing, Progressing     Problem: Fluid and Electrolyte Imbalance (Acute Kidney Injury/Impairment)  Goal: Fluid and Electrolyte Balance  Outcome: Ongoing, Progressing     Problem: Oral Intake Inadequate (Acute Kidney Injury/Impairment)  Goal: Optimal Nutrition Intake  Outcome: Ongoing, Progressing     Problem: Renal Function Impairment (Acute Kidney Injury/Impairment)  Goal: Effective Renal Function  Outcome: Ongoing, Progressing     Problem: Fluid Imbalance (Pneumonia)  Goal: Fluid Balance  Outcome: Ongoing, Progressing     Problem: Infection (Pneumonia)  Goal: Resolution of Infection Signs and Symptoms  Outcome: Ongoing, Progressing     Problem: Respiratory Compromise (Pneumonia)  Goal: Effective Oxygenation and Ventilation  Outcome: Ongoing, Progressing     Problem: Gas Exchange Impaired  Goal: Optimal Gas Exchange  Outcome: Ongoing, Progressing     Problem: Infection  Goal: Absence of Infection Signs and Symptoms  Outcome: Ongoing, Progressing     Problem: Skin Injury Risk Increased  Goal: Skin Health and Integrity  Outcome: Ongoing, Progressing     Problem: Communication Impairment (Mechanical Ventilation, Invasive)  Goal: Effective Communication  Outcome: Ongoing, Progressing     Problem: Device-Related Complication Risk (Mechanical Ventilation, Invasive)  Goal: Optimal Device Function  Outcome: Ongoing, Progressing     Problem: Inability to Wean (Mechanical Ventilation, Invasive)  Goal: Mechanical Ventilation Liberation  Outcome: Ongoing, Progressing     Problem: Nutrition Impairment (Mechanical Ventilation, Invasive)  Goal: Optimal  Nutrition Delivery  Outcome: Ongoing, Progressing     Problem: Skin and Tissue Injury (Mechanical Ventilation, Invasive)  Goal: Absence of Device-Related Skin and Tissue Injury  Outcome: Ongoing, Progressing     Problem: Ventilator-Induced Lung Injury (Mechanical Ventilation, Invasive)  Goal: Absence of Ventilator-Induced Lung Injury  Outcome: Ongoing, Progressing     Problem: Communication Impairment (Artificial Airway)  Goal: Effective Communication  Outcome: Ongoing, Progressing     Problem: Device-Related Complication Risk (Artificial Airway)  Goal: Optimal Device Function  Outcome: Ongoing, Progressing     Problem: Skin and Tissue Injury (Artificial Airway)  Goal: Absence of Device-Related Skin or Tissue Injury  Outcome: Ongoing, Progressing     Problem: Noninvasive Ventilation Acute  Goal: Effective Unassisted Ventilation and Oxygenation  Outcome: Ongoing, Progressing     Problem: Fall Injury Risk  Goal: Absence of Fall and Fall-Related Injury  Outcome: Ongoing, Progressing     Problem: Restraint, Nonbehavioral (Nonviolent)  Goal: Absence of Harm or Injury  Outcome: Ongoing, Progressing     Problem: Adjustment to Illness (Sepsis/Septic Shock)  Goal: Optimal Coping  Outcome: Ongoing, Progressing     Problem: Bleeding (Sepsis/Septic Shock)  Goal: Absence of Bleeding  Outcome: Ongoing, Progressing     Problem: Glycemic Control Impaired (Sepsis/Septic Shock)  Goal: Blood Glucose Level Within Desired Range  Outcome: Ongoing, Progressing     Problem: Infection Progression (Sepsis/Septic Shock)  Goal: Absence of Infection Signs and Symptoms  Outcome: Ongoing, Progressing     Problem: Nutrition Impaired (Sepsis/Septic Shock)  Goal: Optimal Nutrition Intake  Outcome: Ongoing, Progressing

## 2023-08-26 NOTE — ASSESSMENT & PLAN NOTE
CAD s/p PCI p/w abdominal pain and poor PO tolerance. Tn [place] [address]. TTE with LVEF 15-20%. S/p CorAngio notable for elevated LVEDP with CI 1.4. Initially managed with diuresis for concern of R-L discordance. Course with progression of hypotension into shock with uptrending LA. Initiated on inotropic support with persistent increase in LA; concern for concomitant dynamic LVOT obstruction. Degree of shock warranted mechanical LV support. Cardiology on board and appreciate their recommendations and management. Now s/p pLVAD with Impella. Plan for management as follows:  -   preload: received IVF during procedure; will administer gentle fluid hydration, appreciate Cardiology recs on CVP goal  - rhythm: AFib on amiodarone gtt and digoxine  - inotropy: s/p Impella P6, bedside POCUS on transfer for baseline device positioning, LA Q4H, ScvO2 Q4H to start. Will continue dobutamine at 2.5 mcg/kg/min  - afterload: n/a  - holding on systemic anticoagulation given INR elevation; this may be solely reflective of ongoing hepatic dysfunction. Unable to have heparin solely though line. Will follow serial INR and clinical evaluation for bleeding diathesis and consider systemic heparin gtt  - started on stress dose steroids for differential of adrenal insufficiency/crisis; will continue this, cortisol pending; additional indication includes septic shock  - Cardiology on board, appreciate recommendations    08/26-- continue Impella and inotropic support - [facility] has accepted the pateint; however, no beds available at this time.

## 2023-08-26 NOTE — ASSESSMENT & PLAN NOTE
On amiodarone gtt. Bolus 150 mg IV x1  - cont amiodarone gtt  - see shock plan for A/c discussion    8/26- heart rate 110-120's

## 2023-08-27 LAB
CORTIS SERPL-MCNC: 109.6 ΜG/DL
UA COMPLETE W REFLEX CULTURE PNL UR: NO GROWTH

## 2023-08-27 NOTE — PROGRESS NOTES
Pharmacist Renal Dose Adjustment Note    Ora Sewell is a 72 y.o. female being treated with the medication Zosyn    Patient Data:    Vital Signs (Most Recent):  Temp: 98.2 °F (36.8 °C) (08/26/23 1900)  Pulse: 110 (08/26/23 1900)  Resp: 15 (08/26/23 1900)  BP: (!) 91/57 (08/26/23 1845)  SpO2: 96 % (08/26/23 1900) Vital Signs (72h Range):  Temp:  [93.9 °F (34.4 °C)-98.8 °F (37.1 °C)]   Pulse:  []   Resp:  [12-37]   BP: ()/()   SpO2:  [81 %-98 %]      Recent Labs   Lab 08/25/23  0511 08/25/23  1427 08/26/23  0246   CREATININE 2.91* 3.58* 4.19*     Serum creatinine: 4.19 mg/dL (H) 08/26/23 0246  Estimated creatinine clearance: 14.4 mL/min (A)    Medication:Zosyn dose: 4.5gm frequency q8h will be changed to medication:Zosyn dose:4.5gm frequency:q12hr    Pharmacist's Name: Kash Murcia  Pharmacist's Extension: [phone]

## 2023-08-28 LAB
BSA FOR ECHO PROCEDURE: 2.21 M2
CREAT UR-MCNC: 28 MG/DL (ref 28–219)
CULTURE, LOWER RESPIRATORY: ABNORMAL
GRAM STN SPEC: ABNORMAL
GRAM STN SPEC: ABNORMAL
SODIUM UR-SCNC: 65 MMOL/L (ref 40–220)

## 2023-08-28 NOTE — PLAN OF CARE
[facility] ICU  Discharge Final Note    Primary Care Provider: Asad Mendez IV, DO    Expected Discharge Date: 8/26/2023    Final Discharge Note (most recent)       Final Note - 08/28/23 0812          Final Note    Assessment Type Final Discharge Note     Anticipated Discharge Disposition Short Term Hospital                     Important Message from Medicare  Important Message from Medicare regarding Discharge Appeal Rights: Given to patient/caregiver, Explained to patient/caregiver, Signed/date by patient/caregiver     Date IMM was signed: 08/24/23  Time IMM was signed: 1354      Patient transferred to [facility].

## 2023-08-29 LAB
HCO3 UR-SCNC: 19.7 MMOL/L (ref 21–28)
HCO3 UR-SCNC: 22.3 MMOL/L (ref 24–28)
PCO2 BLDA: 44 MMHG (ref 35–48)
PCO2 BLDA: 52 MMHG (ref 41–51)
PH SMN: 7.24 [PH] (ref 7.32–7.42)
PH SMN: 7.26 [PH] (ref 7.35–7.45)
PO2 BLDA: 52 MMHG (ref 25–40)
PO2 BLDA: 87 MMHG (ref 83–108)
POC ACTIVATED CLOTTING TIME K: 198 SEC
POC ACTIVATED CLOTTING TIME K: 269 SEC
POC BASE EXCESS: -5.4 MMOL/L (ref -2–3)
POC BASE EXCESS: -7.2 MMOL/L (ref -2–3)
POC SATURATED O2: 79 % (ref 40–70)
POC SATURATED O2: 95 % (ref 95–98)

## 2023-08-30 LAB
BACTERIA BLD CULT: NORMAL
BACTERIA BLD CULT: NORMAL

## 2023-08-31 NOTE — ADDENDUM NOTE
Encounter addended by: Kristyn Figueroa on: 8/31/2023 11:55 AM   Actions taken: SmartForm saved, Flowsheet accepted

## 2023-09-05 NOTE — DISCHARGE SUMMARY
[facility] ICU  Pulmonology  Discharge Summary      Patient Name: Ora Sewell  MRN: [id number]  Admission Date: 8/23/2023  Hospital Length of Stay: 3 days  Discharge Date and Time: 8/26/2023  7:30 PM  Attending Physician: Patsy att. providers found   Discharging Provider: ANIYA Ramirez-TERESAP  Primary Care Provider: Asad Mendez IV, DO    HPI:  Patient is a 73 y/o female who presents to the [facility] transferred from [facility] ED with complaint of vomiting for 4 days. She states the vomiting started out of no where and has been occurring around every 2 hours. She is not able to keep anything down including her meds. Motion makes her symptoms worse. Sometimes turning off all of the light helps the nausea go away. Patient also reports fever that occurred the first day of her illness, abdominal pain that she attributes to repetitive vomiting, weakness in her legs, dizziness, shortness of breath, wheezing leg swelling, periodic palpations, and easy bruising and heartburn that occurs every 2 weeks. She also reports history of melena with the last occurrence a year ago. Patient denies hematemesis, urinary problems,vision changes, chest pain. She also denies any sick contacts. Patient with a significant PMH of MAKENNA, Atrial fibrillation, hypertension, hyperlipidemia,CAD, gastritis, kidney stones, and asthma.      Procedure(s) (LRB):  INSERTION, IMPELLA (N/A)  INSERTION, CATHETER, RIGHT HEART (Right)    Indwelling Lines/Drains at Time of Discharge:   Lines/Drains/Airways     Central Venous Catheter Line  Duration           Percutaneous Central Line Insertion/Assessment - Triple Lumen  08/25/23 1215 Internal Jugular Left 11 days         Hemodialysis Catheter 08/26/23 1400 right internal jugular 10 days          Airway  Duration                Airway - Non-Surgical 08/24/23 2349 Endotracheal Tube 11 days                Hospital Course:  Patient is a 73 y/o female who presents to the [facility] transferred from  [facility] ED with complaint of vomiting for 4 days. She states the vomiting started out of no where and has been occurring around every 2 hours. She is not able to keep anything down including her meds. Motion makes her symptoms worse. Sometimes turning off all of the light helps the nausea go away. Patient also reports fever that occurred the first day of her illness, abdominal pain that she attributes to repetitive vomiting, weakness in her legs, dizziness, shortness of breath, wheezing leg swelling, periodic palpations, and easy bruising and heartburn that occurs every 2 weeks. She also reports history of melena with the last occurrence a year ago. Patient denies hematemesis, urinary problems,vision changes, chest pain. She also denies any sick contacts. Patient with a significant PMH of MAKENNA, Atrial fibrillation, hypertension, hyperlipidemia,CAD, gastritis, kidney stones, and asthma.    Hospital Course -- pt admitted to the floor to the hospitalist service for nausea/vomiting. GI was consulted. She went in to distress overnight requiring intubation and was moved to the ICU where she was diagnosis with cardiogenic shock resulting in multiorgan system failure. Cardiology was consulted and placed an Impella on 08/25. Nephrology was consulted and she was started on hemodialysis 08/26. She was accepted and transferred to [facility] for a higher level of care and discharged on the evening of 08/26/2023.        Pulmonary  Acute respiratory failure with hypoxia  Acute respiratory failure with hypoxia attributed to ongoing shock. CXR with no acute process. Intubated 08/24 overnight. Plateau pressure 16.  Vent Mode: A/C  Oxygen Concentration (%):  [50] 50  Resp Rate Total:  [21 br/min-28 br/min] 21 br/min  Vt Set:  [450 mL-500 mL] 500 mL  PEEP/CPAP:  [5 cmH20] 5 cmH20  Mean Airway Pressure:  [8 cmH20-9 cmH20] 8 cmH20  - ABG Qam and PRN     8/26- ABG - 7.33/43/90 - driving pressure 12 - will increase to rate 22 - continue  to rest on vent      Cardiac/Vascular  * Cardiogenic shock  CAD s/p PCI p/w abdominal pain and poor PO tolerance. [place] at [address]. TTE with LVEF 15-20%. S/p CorAngio notable for elevated LVEDP with CI 1.4. Initially managed with diuresis for concern of R-L discordance. Course with progression of hypotension into shock with uptrending LA. Initiated on inotropic support with persistent increase in LA; concern for concomitant dynamic LVOT obstruction. Degree of shock warranted mechanical LV support. Cardiology on board and appreciate their recommendations and management. Now s/p pLVAD with Impella. Plan for management as follows:  -   preload: received IVF during procedure; will administer gentle fluid hydration, appreciate Cardiology recs on CVP goal  - rhythm: AFib on amiodarone gtt and digoxine  - inotropy: s/p Impella P6, bedside POCUS on transfer for baseline device positioning, LA Q4H, ScvO2 Q4H to start. Will continue dobutamine at 2.5 mcg/kg/min  - afterload: n/a  - holding on systemic anticoagulation given INR elevation; this may be solely reflective of ongoing hepatic dysfunction. Unable to have heparin solely though line. Will follow serial INR and clinical evaluation for bleeding diathesis and consider systemic heparin gtt  - started on stress dose steroids for differential of adrenal insufficiency/crisis; will continue this, cortisol pending; additional indication includes septic shock  - Cardiology on board, appreciate recommendations     08/26-- continue Impella and inotropic support - [facility] has accepted the pateint; however, no beds available at this time.         Low cardiac output syndrome  Plan as above - cardiology following      Atrial fibrillation with rapid ventricular response  On amiodarone gtt. Bolus 150 mg IV x1  - cont amiodarone gtt  - see shock plan for A/c discussion     8/26- heart rate 110-120's      Renal/  Hypocalcemia  5.8-- replace IV      Hyponatremia     obtain urine  electrolytes  One time dose of IV lasix      MAKENNA (acute kidney injury)  2/2 cardiogenic shock. Hypovolemic on evaluation. Nephrology on board and appreciate recommendations.   - received 1.5L IVF during impella insertion   - admin additional 500cc now  - strict Is/Os     8/26- discussed with cardiology - will check urine electrolytes and osmolality - elevated PA pressures; UOP 155cc overnight; will give one time dose of IV lasix and see how she responds.      ID  Severe sepsis  Aspiration event. Concern for multifactorial shock.   - cont Linezolid IV and Zosyn IV  - BCx, tracheal Cxs, urine Cxs sent, f/u  - f/u MRSA swab     8/26 - MRSA swab negative - dc zyvox      GI  Shock liver  2/2 cardiogenic shock.  - daily LFTs and iNR          Goals of Care Treatment Preferences:  Code Status: Full Code      Consults (From admission, onward)        Status Ordering Provider     Inpatient consult to Registered Dietitian/Nutritionist  Once        Provider:  (Not yet assigned)    Completed BENITO AYALA     Inpatient consult to Nephrology  Once        Provider:  Dayna Garcia DO    Completed EVAN NAGEL     Inpatient consult to Cardiology  Once        Provider:  Benito Ayala MD    Completed YAIR SIMPSON     Inpatient consult to Gastroenterology  Once        Provider:  Js Garcia MD    Completed JONES ARIAS          Significant Labs:  All pertinent labs within the past 24 hours have been reviewed.    Significant Imaging:  I have reviewed all pertinent imaging results/findings within the past 24 hours.    Pending Diagnostic Studies:     None        Final Active Diagnoses:    Diagnosis Date Noted POA    PRINCIPAL PROBLEM:  Cardiogenic shock [R57.0] 08/25/2023 Unknown    Hyponatremia [E87.1] 08/26/2023 Yes    Hypocalcemia [E83.51] 08/26/2023 Yes    Encephalopathy, metabolic [G93.41] 08/25/2023 Yes    Acute respiratory failure with hypoxia [J96.01] 08/25/2023 Yes    Coagulopathy [D68.9] 08/25/2023 Yes     Malnutrition of mild degree [E44.1] 08/25/2023 Yes    Severe sepsis [A41.9, R65.20] 08/25/2023 Yes    Shock liver [K72.00] 08/25/2023 Yes    Elevated brain natriuretic peptide (BNP) level [R79.89] 08/24/2023 Yes    Hypokalemia [E87.6] 08/24/2023 Yes    Hypomagnesemia [E83.42] 08/24/2023 Yes    Low cardiac output syndrome [I50.9] 08/24/2023 Unknown    Acute systolic heart failure [I50.21] 08/24/2023 Yes    CAD (coronary artery disease) [I25.10] 08/24/2023 Yes    Dehydration with hyponatremia [E86.0, E87.1] 08/23/2023 Yes    Intractable vomiting with nausea [R11.2] 08/23/2023 Yes    Atrial fibrillation with rapid ventricular response [I48.91] 08/23/2023 Yes    Demand ischemia [I24.8] 08/23/2023 Yes    Aspiration pneumonitis [J69.0] 08/23/2023 Yes    MAKENNA (acute kidney injury) [N17.9] 10/08/2021 Yes      Problems Resolved During this Admission:    Diagnosis Date Noted Date Resolved POA    Elevated troponin [R77.8] 08/24/2023 08/25/2023 Yes       Discharged Condition: critical    Disposition: Another Health Care Inst*    Follow Up:    Patient Instructions:   No discharge procedures on file.  Medications:  Transfer Medications (for Discharge Readmit only):   No current facility-administered medications for this encounter.     Current Outpatient Medications   Medication Sig Dispense Refill    acetaminophen (TYLENOL EXTRA STRENGTH ORAL) Take 4 tablets by mouth 2 (two) times daily as needed.      aspirin/salicylamide/caffeine (BC HEADACHE POWDER ORAL) Take 1 Package by mouth every 4 to 6 hours as needed.      ferrous sulfate 325 mg (65 mg iron) CpSR Take 1 tablet by mouth once daily. 30 capsule 5    HYDROcodone-acetaminophen (NORCO) 5-325 mg per tablet Take 1 tablet by mouth every 6 (six) hours as needed for Pain.      metoprolol succinate (TOPROL-XL) 50 MG 24 hr tablet Take 1 tablet (50 mg total) by mouth 2 (two) times daily. 60 tablet 11    NIFEdipine (ADALAT CC) 60 MG TbSR Take 1 tablet (60 mg  total) by mouth once daily. 30 tablet 5    pantoprazole (PROTONIX) 40 MG tablet Take 1 tablet (40 mg total) by mouth once daily. 30 tablet 5    pravastatin (PRAVACHOL) 80 MG tablet Take 1 tablet (80 mg total) by mouth once daily. 30 tablet 5    rivaroxaban (XARELTO) 20 mg Tab Take 1 tablet (20 mg total) by mouth daily with dinner or evening meal. 30 tablet 5    venlafaxine (EFFEXOR-XR) 150 MG Cp24 Take 1 capsule (150 mg total) by mouth once daily. 30 capsule 5    vitamin D 1000 units Tab Take 1,000 Units by mouth once daily.       Facility-Administered Medications Ordered in Other Encounters   Medication Dose Route Frequency Provider Last Rate Last Admin    0.9%  NaCl infusion   Intravenous Continuous Pellegrin, Willian RAY  mL/hr at 08/10/23 1108 New Bag at 08/10/23 1108       ANIYA Ramirez-ACNP  Pulmonology  [facility] ICU   Attending Only

## 2023-09-05 NOTE — HOSPITAL COURSE
Patient is a 71 y/o female who presents to the OR transferred from [facility] ED with complaint of vomiting for 4 days. She states the vomiting started out of no where and has been occurring around every 2 hours. She is not able to keep anything down including her meds. Motion makes her symptoms worse. Sometimes turning off all of the light helps the nausea go away. Patient also reports fever that occurred the first day of her illness, abdominal pain that she attributes to repetitive vomiting, weakness in her legs, dizziness, shortness of breath, wheezing leg swelling, periodic palpations, and easy bruising and heartburn that occurs every 2 weeks. She also reports history of melena with the last occurrence a year ago. Patient denies hematemesis, urinary problems,vision changes, chest pain. She also denies any sick contacts. Patient with a significant PMH of MAKENNA, Atrial fibrillation, hypertension, hyperlipidemia,CAD, gastritis, kidney stones, and asthma.    Hospital Course -- pt admitted to the floor to the hospitalist service for nausea/vomiting. GI was consulted. She went in to distress overnight requiring intubation and was moved to the ICU where she was diagnosis with cardiogenic shock resulting in multiorgan system failure. Cardiology was consulted and placed an Impella on 08/25. Nephrology was consulted and she was started on hemodialysis 08/26. She was accepted and transferred to [facility] for a higher level of care and discharged on the evening of 08/26/2023.        Pulmonary  Acute respiratory failure with hypoxia  Acute respiratory failure with hypoxia attributed to ongoing shock. CXR with no acute process. Intubated 08/24 overnight. Plateau pressure 16.  Vent Mode: A/C  Oxygen Concentration (%):  [50] 50  Resp Rate Total:  [21 br/min-28 br/min] 21 br/min  Vt Set:  [450 mL-500 mL] 500 mL  PEEP/CPAP:  [5 cmH20] 5 cmH20  Mean Airway Pressure:  [8 cmH20-9 cmH20] 8 cmH20  - ABG Qam and PRN     8/26- ABG -  7.33/43/90 - driving pressure 12 - will increase to rate 22 - continue to rest on vent      Cardiac/Vascular  * Cardiogenic shock  CAD s/p PCI p/w abdominal pain and poor PO tolerance. [place] [address]. TTE with LVEF 15-20%. S/p CorAngio notable for elevated LVEDP with CI 1.4. Initially managed with diuresis for concern of R-L discordance. Course with progression of hypotension into shock with uptrending LA. Initiated on inotropic support with persistent increase in LA; concern for concomitant dynamic LVOT obstruction. Degree of shock warranted mechanical LV support. Cardiology on board and appreciate their recommendations and management. Now s/p pLVAD with Impella. Plan for management as follows:  -   preload: received IVF during procedure; will administer gentle fluid hydration, appreciate Cardiology recs on CVP goal  - rhythm: AFib on amiodarone gtt and digoxine  - inotropy: s/p Impella P6, bedside POCUS on transfer for baseline device positioning, LA Q4H, ScvO2 Q4H to start. Will continue dobutamine at 2.5 mcg/kg/min  - afterload: n/a  - holding on systemic anticoagulation given INR elevation; this may be solely reflective of ongoing hepatic dysfunction. Unable to have heparin solely though line. Will follow serial INR and clinical evaluation for bleeding diathesis and consider systemic heparin gtt  - started on stress dose steroids for differential of adrenal insufficiency/crisis; will continue this, cortisol pending; additional indication includes septic shock  - Cardiology on board, appreciate recommendations     08/26-- continue Impella and inotropic support - [facility] the pateint; however, no beds available at this time.         Low cardiac output syndrome  Plan as above - cardiology following      Atrial fibrillation with rapid ventricular response  On amiodarone gtt. Bolus 150 mg IV x1  - cont amiodarone gtt  - see shock plan for A/c discussion     8/26- heart rate 110-120's       Renal/  Hypocalcemia  5.8-- replace IV      Hyponatremia     obtain urine electrolytes  One time dose of IV lasix      MAKENNA (acute kidney injury)  2/2 cardiogenic shock. Hypovolemic on evaluation. Nephrology on board and appreciate recommendations.   - received 1.5L IVF during impella insertion   - admin additional 500cc now  - strict Is/Os     8/26- discussed with cardiology - will check urine electrolytes and osmolality - elevated PA pressures; UOP 155cc overnight; will give one time dose of IV lasix and see how she responds.      ID  Severe sepsis  Aspiration event. Concern for multifactorial shock.   - cont Linezolid IV and Zosyn IV  - BCx, tracheal Cxs, urine Cxs sent, f/u  - f/u MRSA swab     8/26 - MRSA swab negative - dc zyvox      GI  Shock liver  2/2 cardiogenic shock.  - daily LFTs and iNR

## 2023-09-09 DIAGNOSIS — Z71.89 COMPLEX CARE COORDINATION: ICD-10-CM

## 2023-11-27 PROBLEM — R65.20 SEVERE SEPSIS: Status: RESOLVED | Noted: 2023-08-25 | Resolved: 2023-11-27

## 2023-11-27 PROBLEM — A41.9 SEVERE SEPSIS: Status: RESOLVED | Noted: 2023-08-25 | Resolved: 2023-11-27

## 2023-11-27 PROBLEM — J96.01 ACUTE RESPIRATORY FAILURE WITH HYPOXIA: Status: RESOLVED | Noted: 2023-08-25 | Resolved: 2023-11-27

## 2023-11-27 PROBLEM — N17.9 AKI (ACUTE KIDNEY INJURY): Status: RESOLVED | Noted: 2021-10-08 | Resolved: 2023-11-27

## 2024-03-26 NOTE — PLAN OF CARE
Problem: Gas Exchange Impaired  Goal: Optimal Gas Exchange  Outcome: Ongoing, Progressing     Problem: Communication Impairment (Mechanical Ventilation, Invasive)  Goal: Effective Communication  Outcome: Ongoing, Progressing     Problem: Device-Related Complication Risk (Mechanical Ventilation, Invasive)  Goal: Optimal Device Function  Outcome: Ongoing, Progressing     Problem: Inability to Wean (Mechanical Ventilation, Invasive)  Goal: Mechanical Ventilation Liberation  Outcome: Ongoing, Progressing     Problem: Skin and Tissue Injury (Mechanical Ventilation, Invasive)  Goal: Absence of Device-Related Skin and Tissue Injury  Outcome: Ongoing, Progressing     Problem: Ventilator-Induced Lung Injury (Mechanical Ventilation, Invasive)  Goal: Absence of Ventilator-Induced Lung Injury  Outcome: Ongoing, Progressing     Problem: Communication Impairment (Artificial Airway)  Goal: Effective Communication  Outcome: Ongoing, Progressing     Problem: Device-Related Complication Risk (Artificial Airway)  Goal: Optimal Device Function  Outcome: Ongoing, Progressing     Problem: Skin and Tissue Injury (Artificial Airway)  Goal: Absence of Device-Related Skin or Tissue Injury  Outcome: Ongoing, Progressing      Referral info given to pt and last note and referral faxed to provider

## 2024-04-09 DIAGNOSIS — Z71.89 COMPLEX CARE COORDINATION: ICD-10-CM

## (undated) DEVICE — Device

## (undated) DEVICE — VASC HEMO BAND

## (undated) DEVICE — CLIPPER BLADE MOD 4406 (CAREF)

## (undated) DEVICE — BUR FORMULA BRL 12 FLUTE 5.5MM

## (undated) DEVICE — CATH DIAG IMPULSE 6FR FL4

## (undated) DEVICE — DRAPE INCISE IOBAN 2 13X13IN

## (undated) DEVICE — CATH IMPELLA CP 14F 4.7X65MM

## (undated) DEVICE — PROBE SERFAS ENERGY 90S CRSE

## (undated) DEVICE — INTRODUCER CATH 6F 11CM

## (undated) DEVICE — GOWN NONREINF SET-IN SLV 2XL

## (undated) DEVICE — TUBING CROSSFLOW INFLOW CASS

## (undated) DEVICE — ELECTRODE EKG QUICK COMBO

## (undated) DEVICE — DRESSING TRANS 4X4 TEGADERM

## (undated) DEVICE — KIT GLIDESHEATH SLEND 6FR 10CM

## (undated) DEVICE — GLOVE 7.5 PROTEXIS PI BLUE

## (undated) DEVICE — DRAPE ANGIO BRACH 38X44IN

## (undated) DEVICE — GLOVE 8 PROTEXIS PI BLUE

## (undated) DEVICE — SHAVER TOMCAT 4.0

## (undated) DEVICE — GLOVE BIOGEL SKINSENSE PI 7.5

## (undated) DEVICE — DECANTER FLUID TRNSF WHITE 9IN

## (undated) DEVICE — CONTRAST ISOVUE 370 100ML

## (undated) DEVICE — CATH DIAG JACKY 3.5 6FRX110CM

## (undated) DEVICE — DEVICE MYNX GRIP 6/7F

## (undated) DEVICE — SHEATH INTRODUCER 6FR 11CM

## (undated) DEVICE — SET IV PRIMARY

## (undated) DEVICE — PROTECTOR ULNAR NERVE FOAM

## (undated) DEVICE — OXISENSOR ADULT DIGIT N/S

## (undated) DEVICE — CATH SWAN GANZ STND 7FR

## (undated) DEVICE — GLOVE SURG BIOGEL LATEX SZ 7.5

## (undated) DEVICE — SOL NACL IRR 3000ML

## (undated) DEVICE — CHLORAPREP 10.5 ML APPLICATOR

## (undated) DEVICE — INTRODUCER KIT MICRO 4FR

## (undated) DEVICE — SLING ARM LARGE FOAM STRAP

## (undated) DEVICE — SUT 2/0 30IN SILK BLK BRAI

## (undated) DEVICE — APPLICATOR CHLORAPREP ORN 26ML

## (undated) DEVICE — CATH DIAG IMPULSE 6FR FR4

## (undated) DEVICE — NDL PERCUTANEOUS ENTRYBSDN 18

## (undated) DEVICE — GLOVE PROTEXIS PI CRM 5.5

## (undated) DEVICE — CATH OPTITORQUE RADIAL 5FR

## (undated) DEVICE — KIT SHLDR POMIERSKIWATSON RUSH

## (undated) DEVICE — STOPCOCK 3 WAY MED PRESSURE

## (undated) DEVICE — SYR 10CC LUER LOCK

## (undated) DEVICE — SET EXTENSION CLEARLINK 2INJ

## (undated) DEVICE — GLOVE 7.0 PROTEXIS PI BLUE

## (undated) DEVICE — SHEATH INTRODUCER 7FR 11CM

## (undated) DEVICE — INTRODUCER CATH 7F 11CML

## (undated) DEVICE — TOWEL OR DISP STRL BLUE 4/PK

## (undated) DEVICE — GLOVE BIOGEL SKINSENSE PI 7.0

## (undated) DEVICE — COVER PROBE US GEL BAND

## (undated) DEVICE — GLOVE BIOGEL SKINSENSE PI 6.5

## (undated) DEVICE — GUIDE WIRE WHOLLY FLOPPY

## (undated) DEVICE — CATH IMPULSE PIGTAIL 6F 110CM

## (undated) DEVICE — SPONGE COTTON TRAY 4X4IN

## (undated) DEVICE — KIT GLIDESHEATH 025IN 5FR 10CM